# Patient Record
Sex: FEMALE | Race: OTHER | Employment: FULL TIME | URBAN - METROPOLITAN AREA
[De-identification: names, ages, dates, MRNs, and addresses within clinical notes are randomized per-mention and may not be internally consistent; named-entity substitution may affect disease eponyms.]

---

## 2022-10-14 ENCOUNTER — APPOINTMENT (OUTPATIENT)
Dept: GENERAL RADIOLOGY | Age: 27
DRG: 163 | End: 2022-10-14

## 2022-10-14 ENCOUNTER — HOSPITAL ENCOUNTER (INPATIENT)
Age: 27
LOS: 3 days | Discharge: HOME | DRG: 163 | End: 2022-10-17
Attending: EMERGENCY MEDICINE | Admitting: INTERNAL MEDICINE

## 2022-10-14 ENCOUNTER — APPOINTMENT (OUTPATIENT)
Dept: CT IMAGING | Age: 27
DRG: 163 | End: 2022-10-14

## 2022-10-14 DIAGNOSIS — I26.09 ACUTE PULMONARY EMBOLISM WITH ACUTE COR PULMONALE, UNSPECIFIED PULMONARY EMBOLISM TYPE (HCC): Primary | ICD-10-CM

## 2022-10-14 DIAGNOSIS — J96.01 ACUTE RESPIRATORY FAILURE WITH HYPOXIA (HCC): ICD-10-CM

## 2022-10-14 DIAGNOSIS — R77.8 ELEVATED TROPONIN: ICD-10-CM

## 2022-10-14 LAB
A/G RATIO: 1.2 (ref 1.1–2.2)
ALBUMIN SERPL-MCNC: 4.7 G/DL (ref 3.4–5)
ALP BLD-CCNC: 100 U/L (ref 40–129)
ALT SERPL-CCNC: 23 U/L (ref 10–40)
ANION GAP SERPL CALCULATED.3IONS-SCNC: 15 MMOL/L (ref 3–16)
APTT: 28.2 SEC (ref 23–34.3)
AST SERPL-CCNC: 27 U/L (ref 15–37)
BASOPHILS ABSOLUTE: 0.1 K/UL (ref 0–0.2)
BASOPHILS RELATIVE PERCENT: 0.4 %
BILIRUB SERPL-MCNC: 0.3 MG/DL (ref 0–1)
BUN BLDV-MCNC: 11 MG/DL (ref 7–20)
C-REACTIVE PROTEIN: 22.3 MG/L (ref 0–5.1)
CALCIUM SERPL-MCNC: 9.8 MG/DL (ref 8.3–10.6)
CHLORIDE BLD-SCNC: 102 MMOL/L (ref 99–110)
CO2: 23 MMOL/L (ref 21–32)
CREAT SERPL-MCNC: 0.9 MG/DL (ref 0.6–1.1)
D DIMER: >20 UG/ML FEU (ref 0–0.6)
EOSINOPHILS ABSOLUTE: 0 K/UL (ref 0–0.6)
EOSINOPHILS RELATIVE PERCENT: 0.2 %
GFR AFRICAN AMERICAN: >60
GFR NON-AFRICAN AMERICAN: >60
GLUCOSE BLD-MCNC: 114 MG/DL (ref 70–99)
HCG QUALITATIVE: NEGATIVE
HCT VFR BLD CALC: 43.9 % (ref 36–48)
HCT VFR BLD CALC: 48 % (ref 36–48)
HEMOGLOBIN: 14.4 G/DL (ref 12–16)
HEMOGLOBIN: 15.4 G/DL (ref 12–16)
LYMPHOCYTES ABSOLUTE: 1.4 K/UL (ref 1–5.1)
LYMPHOCYTES RELATIVE PERCENT: 10.6 %
MCH RBC QN AUTO: 31 PG (ref 26–34)
MCH RBC QN AUTO: 31.9 PG (ref 26–34)
MCHC RBC AUTO-ENTMCNC: 32.1 G/DL (ref 31–36)
MCHC RBC AUTO-ENTMCNC: 32.8 G/DL (ref 31–36)
MCV RBC AUTO: 96.4 FL (ref 80–100)
MCV RBC AUTO: 97.3 FL (ref 80–100)
MONOCYTES ABSOLUTE: 0.6 K/UL (ref 0–1.3)
MONOCYTES RELATIVE PERCENT: 4.3 %
NEUTROPHILS ABSOLUTE: 11.2 K/UL (ref 1.7–7.7)
NEUTROPHILS RELATIVE PERCENT: 84.5 %
PDW BLD-RTO: 13.5 % (ref 12.4–15.4)
PDW BLD-RTO: 13.8 % (ref 12.4–15.4)
PLATELET # BLD: 147 K/UL (ref 135–450)
PLATELET # BLD: 152 K/UL (ref 135–450)
PMV BLD AUTO: 8 FL (ref 5–10.5)
PMV BLD AUTO: 8.4 FL (ref 5–10.5)
POTASSIUM REFLEX MAGNESIUM: 3.8 MMOL/L (ref 3.5–5.1)
PRO-BNP: 113 PG/ML (ref 0–124)
RBC # BLD: 4.52 M/UL (ref 4–5.2)
RBC # BLD: 4.98 M/UL (ref 4–5.2)
SEDIMENTATION RATE, ERYTHROCYTE: 15 MM/HR (ref 0–20)
SODIUM BLD-SCNC: 140 MMOL/L (ref 136–145)
TOTAL PROTEIN: 8.5 G/DL (ref 6.4–8.2)
TROPONIN: 0.36 NG/ML
WBC # BLD: 13.3 K/UL (ref 4–11)
WBC # BLD: 13.3 K/UL (ref 4–11)

## 2022-10-14 PROCEDURE — 2000000000 HC ICU R&B

## 2022-10-14 PROCEDURE — 93005 ELECTROCARDIOGRAM TRACING: CPT | Performed by: PHYSICIAN ASSISTANT

## 2022-10-14 PROCEDURE — 84484 ASSAY OF TROPONIN QUANT: CPT

## 2022-10-14 PROCEDURE — 71260 CT THORAX DX C+: CPT | Performed by: PHYSICIAN ASSISTANT

## 2022-10-14 PROCEDURE — 36415 COLL VENOUS BLD VENIPUNCTURE: CPT

## 2022-10-14 PROCEDURE — 85652 RBC SED RATE AUTOMATED: CPT

## 2022-10-14 PROCEDURE — 6360000004 HC RX CONTRAST MEDICATION: Performed by: EMERGENCY MEDICINE

## 2022-10-14 PROCEDURE — 80053 COMPREHEN METABOLIC PANEL: CPT

## 2022-10-14 PROCEDURE — 83880 ASSAY OF NATRIURETIC PEPTIDE: CPT

## 2022-10-14 PROCEDURE — 84703 CHORIONIC GONADOTROPIN ASSAY: CPT

## 2022-10-14 PROCEDURE — 85379 FIBRIN DEGRADATION QUANT: CPT

## 2022-10-14 PROCEDURE — 6360000002 HC RX W HCPCS: Performed by: PHYSICIAN ASSISTANT

## 2022-10-14 PROCEDURE — 85730 THROMBOPLASTIN TIME PARTIAL: CPT

## 2022-10-14 PROCEDURE — 85027 COMPLETE CBC AUTOMATED: CPT

## 2022-10-14 PROCEDURE — 86140 C-REACTIVE PROTEIN: CPT

## 2022-10-14 PROCEDURE — 99285 EMERGENCY DEPT VISIT HI MDM: CPT

## 2022-10-14 PROCEDURE — 71045 X-RAY EXAM CHEST 1 VIEW: CPT

## 2022-10-14 PROCEDURE — 85025 COMPLETE CBC W/AUTO DIFF WBC: CPT

## 2022-10-14 PROCEDURE — 96374 THER/PROPH/DIAG INJ IV PUSH: CPT

## 2022-10-14 RX ORDER — HEPARIN SODIUM 1000 [USP'U]/ML
80 INJECTION, SOLUTION INTRAVENOUS; SUBCUTANEOUS PRN
Status: DISCONTINUED | OUTPATIENT
Start: 2022-10-14 | End: 2022-10-16 | Stop reason: ALTCHOICE

## 2022-10-14 RX ORDER — HEPARIN SODIUM 1000 [USP'U]/ML
80 INJECTION, SOLUTION INTRAVENOUS; SUBCUTANEOUS ONCE
Status: COMPLETED | OUTPATIENT
Start: 2022-10-14 | End: 2022-10-14

## 2022-10-14 RX ORDER — HEPARIN SODIUM 1000 [USP'U]/ML
40 INJECTION, SOLUTION INTRAVENOUS; SUBCUTANEOUS PRN
Status: DISCONTINUED | OUTPATIENT
Start: 2022-10-14 | End: 2022-10-16 | Stop reason: ALTCHOICE

## 2022-10-14 RX ORDER — HEPARIN SODIUM 10000 [USP'U]/100ML
5-30 INJECTION, SOLUTION INTRAVENOUS CONTINUOUS
Status: DISCONTINUED | OUTPATIENT
Start: 2022-10-14 | End: 2022-10-16

## 2022-10-14 RX ADMIN — HEPARIN SODIUM 18 UNITS/KG/HR: 10000 INJECTION, SOLUTION INTRAVENOUS at 20:40

## 2022-10-14 RX ADMIN — HEPARIN SODIUM 9430 UNITS: 1000 INJECTION INTRAVENOUS; SUBCUTANEOUS at 20:35

## 2022-10-14 RX ADMIN — IOPAMIDOL 75 ML: 755 INJECTION, SOLUTION INTRAVENOUS at 19:46

## 2022-10-14 ASSESSMENT — PAIN - FUNCTIONAL ASSESSMENT: PAIN_FUNCTIONAL_ASSESSMENT: NONE - DENIES PAIN

## 2022-10-14 ASSESSMENT — PAIN SCALES - GENERAL
PAINLEVEL_OUTOF10: 0
PAINLEVEL_OUTOF10: 0

## 2022-10-14 ASSESSMENT — PATIENT HEALTH QUESTIONNAIRE - PHQ9: SUM OF ALL RESPONSES TO PHQ QUESTIONS 1-9: 24

## 2022-10-14 NOTE — ED PROVIDER NOTES
[facility]        Pt Name: Caren Fernandez  MRN: [id number]  [place] 1995  Date of evaluation: 10/14/2022  Provider: Yasmin Davis PA-C  PCP: No primary care provider on file. Note Started: 4:17 PM EDT        I have seen and evaluated this patient with my supervising physician Akash Bose DO. I personally saw the patient and independently provided 33 minutes of non-concurrent critical care time out of the total critical care time provided. This excludes time spent doing separately billable procedures. This includes time at the bedside, data interpretation, medication management, obtaining critical history from collateral sources if the patient is unable to provide it directly, and physician consultation. Specifics of interventions taken and potentially life-threatening diagnostic considerations are listed above in the medical decision making. CHIEF COMPLAINT       Chief Complaint   Patient presents with    Panic Attack     From home via EMS took 3 Klonopin over last 2-3 days, also consumed [place] lot\" of alcohol over the last week, last alcohol was 3 white claw drinks today (last drink approx. 1430 today). Patient states under stress due to recent death of boyfriend. Having episodes of profuse sweating onset 1400 today. C/o chest tightness at triage. HISTORY OF PRESENT ILLNESS   (Location, Timing/Onset, Context/Setting, Quality, Duration, Modifying Factors, Severity, Associated Signs and Symptoms)  Note limiting factors. Chief Complaint: Shortness of breath, anxiety    Caren Fernandez is a 24 y.o. female who presents to the emergency department with a chief complaint of shortness of breath and anxiety. This began at around 2 PM today. He states he has been under a lot of stress and really not eating much after her boyfriend  about a week ago.   She states she felt fine if she was going over to her grandmother's house and around 2 PM a couple hours before I see her she began getting some palpitations and began feeling short of breath and having muffled hearing like she may have a syncopal episode. Denies hemoptysis, productive cough, chest pain, abdominal pain, vomiting, unilateral axillary tenderness, recent trip or travel, recent surgery or history of DVT or PE. Denies fevers, abdominal pain, urinary or bowel symptoms. States she has been taking Klonopin that she is got from a friend to help her sleep recently. She has been drinking some alcohol also due to all the stress she has been under. Denies black tarry stools, heavy vaginal bleeding or any abnormal bleeding. Nursing Notes were all reviewed and agreed with or any disagreements were addressed in the HPI. REVIEW OF SYSTEMS    (2-9 systems for level 4, 10 or more for level 5)     Review of Systems    Positives and Pertinent negatives as per HPI. Except as noted above in the ROS, all other systems were reviewed and negative. PAST MEDICAL HISTORY     Past Medical History:   Diagnosis Date    Cardiomyopathy [facility])     \"had that since I was born\"         SURGICAL HISTORY   History reviewed. No pertinent surgical history. CURRENTMEDICATIONS       Previous Medications    No medications on file         ALLERGIES     Patient has no known allergies. FAMILYHISTORY     History reviewed. No pertinent family history.        SOCIAL HISTORY       Social History     Tobacco Use    Smoking status: Never    Smokeless tobacco: Never   Vaping Use    Vaping Use: Never used   Substance Use Topics    Alcohol use: Yes    Drug use: Yes     Types: Marijuana (Weed)       SCREENINGS    Anya Coma Scale  Eye Opening: Spontaneous  Best Verbal Response: Oriented  Best Motor Response: Obeys commands  [place] Coma Scale Score: 15        PHYSICAL EXAM    (up to 7 for level 4, 8 or more for level 5)     ED Triage Vitals   BP Temp Temp src Pulse Resp SpO2 Height Weight   -- -- -- -- -- -- -- --       Physical Exam  Vitals and nursing note reviewed. Constitutional:       Appearance: She is well-developed. She is diaphoretic. HENT:      Head: Atraumatic. Nose: Nose normal.   Eyes:      General:         Right eye: No discharge. Left eye: No discharge. Cardiovascular:      Rate and Rhythm: Regular rhythm. Tachycardia present. Heart sounds: No murmur heard. No friction rub. No gallop. Pulmonary:      Effort: No respiratory distress. Breath sounds: No stridor. No wheezing, rhonchi or rales. Comments: Mild tachypnea  Abdominal:      General: Bowel sounds are normal. There is no distension. Palpations: Abdomen is soft. There is no mass. Tenderness: no abdominal tenderness There is no guarding or rebound. Hernia: No hernia is present. Musculoskeletal:         General: No swelling. Normal range of motion. Cervical back: Normal range of motion. Comments: Trace pretibial pitting edema bilaterally. No palpable cords, erythema or calf tenderness. Skin:     General: Skin is warm. Findings: No erythema or rash. Neurological:      Mental Status: She is alert and oriented to person, place, and time. Cranial Nerves: No cranial nerve deficit.    Psychiatric:         Behavior: Behavior normal.       DIAGNOSTIC RESULTS   LABS:    Labs Reviewed   CBC WITH AUTO DIFFERENTIAL - Abnormal; Notable for the following components:       Result Value    WBC 13.3 (*)     Neutrophils Absolute 11.2 (*)     All other components within normal limits   COMPREHENSIVE METABOLIC PANEL W/ REFLEX TO MG FOR LOW K - Abnormal; Notable for the following components:    Glucose 114 (*)     Total Protein 8.5 (*)     All other components within normal limits   D-DIMER, QUANTITATIVE - Abnormal; Notable for the following components:    D-Dimer, Quant >20.00 (*)     All other components within normal limits   TROPONIN - Abnormal; Notable for the following components:    Troponin 0.36 (*)     All other components within normal limits   C-REACTIVE PROTEIN - Abnormal; Notable for the following components:    CRP 22.3 (*)     All other components within normal limits   HCG, SERUM, QUALITATIVE   BRAIN NATRIURETIC PEPTIDE   SEDIMENTATION RATE       When ordered only abnormal lab results are displayed. All other labs were within normal range or not returned as of this dictation. EKG: When ordered, EKG's are interpreted by the Emergency Department Physician in the absence of a cardiologist.  Please see their note for interpretation of EKG. RADIOLOGY:   Non-plain film images such as CT, Ultrasound and MRI are read by the radiologist. Plain radiographic images are visualized and preliminarily interpreted by the ED Provider with the below findings:        Interpretation per the Radiologist below, if available at the time of this note:    XR CHEST PORTABLE   Final Result   No acute process. CT CHEST PULMONARY EMBOLISM W CONTRAST    (Results Pending)     No results found. PROCEDURES   Unless otherwise noted below, none     Procedures    CRITICAL CARE TIME       CONSULTS:  None      EMERGENCY DEPARTMENT COURSE and DIFFERENTIAL DIAGNOSIS/MDM:   Vitals:    Vitals:    10/14/22 1623 10/14/22 1915   BP: (!) 128/94 138/83   Pulse: (!) 119 (!) 119   Resp: 16 25   Temp: 97.8 °F (36.6 °C)    TempSrc: Oral    SpO2: 95% 96%   Weight: 260 lb (117.9 kg)    Height: 5' 7\" (1.702 m)        Patient was given the following medications:  Medications - No data to display      Is this patient to be included in the SEP-1 Core Measure due to severe sepsis or septic shock? No   Exclusion criteria - the patient is NOT to be included for SEP-1 Core Measure due to: Infection is not suspected    Patient presents with palpitations, near syncope. She presented tachycardic and satting in the 80s. She is on 2 L of nasal cannula oxygen and satting in the mid 90s now.   Slightly tachypneic with lung sounds are clear to auscultation. She is elevated D-dimer greater than 20 with a troponin of 0.36. Concern for pulmonary embolus. Still awaiting CT imaging at the end my shift. Has history of remote cardiomyopathy when she was younger but she is unsure of what kind. Continues denies any pain here. Patient will ultimately require admission. Please see attending note for further care. FINAL IMPRESSION    Near syncope  hypoxia    DISPOSITION/PLAN   DISPOSITION        PATIENT REFERRED TO:  No follow-up provider specified.     DISCHARGE MEDICATIONS:  New Prescriptions    No medications on file       DISCONTINUED MEDICATIONS:  Discontinued Medications    No medications on file              (Please note that portions of this note were completed with a voice recognition program.  Efforts were made to edit the dictations but occasionally words are mis-transcribed.)    Kaylyn Preston PA-C (electronically signed)            Kaylyn Preston PA-C  10/14/22 1942

## 2022-10-15 LAB
APTT: 127.4 SEC (ref 23–34.3)
APTT: 74.5 SEC (ref 23–34.3)
APTT: >180 SEC (ref 23–34.3)
BASOPHILS ABSOLUTE: 0.1 K/UL (ref 0–0.2)
BASOPHILS RELATIVE PERCENT: 0.9 %
EKG ATRIAL RATE: 128 BPM
EKG DIAGNOSIS: NORMAL
EKG P AXIS: 46 DEGREES
EKG P-R INTERVAL: 158 MS
EKG Q-T INTERVAL: 296 MS
EKG QRS DURATION: 92 MS
EKG QTC CALCULATION (BAZETT): 432 MS
EKG R AXIS: 66 DEGREES
EKG T AXIS: 37 DEGREES
EKG VENTRICULAR RATE: 128 BPM
EOSINOPHILS ABSOLUTE: 0.1 K/UL (ref 0–0.6)
EOSINOPHILS RELATIVE PERCENT: 0.6 %
HCT VFR BLD CALC: 37.8 % (ref 36–48)
HEMOGLOBIN: 12.5 G/DL (ref 12–16)
HOMOCYSTEINE: 8 UMOL/L (ref 0–10)
LV EF: 60 %
LVEF MODALITY: NORMAL
LYMPHOCYTES ABSOLUTE: 2.9 K/UL (ref 1–5.1)
LYMPHOCYTES RELATIVE PERCENT: 25.6 %
MCH RBC QN AUTO: 31.9 PG (ref 26–34)
MCHC RBC AUTO-ENTMCNC: 33 G/DL (ref 31–36)
MCV RBC AUTO: 96.5 FL (ref 80–100)
MONOCYTES ABSOLUTE: 0.6 K/UL (ref 0–1.3)
MONOCYTES RELATIVE PERCENT: 5.7 %
NEUTROPHILS ABSOLUTE: 7.5 K/UL (ref 1.7–7.7)
NEUTROPHILS RELATIVE PERCENT: 67.2 %
PDW BLD-RTO: 13.2 % (ref 12.4–15.4)
PLATELET # BLD: 140 K/UL (ref 135–450)
PMV BLD AUTO: 8.5 FL (ref 5–10.5)
RBC # BLD: 3.92 M/UL (ref 4–5.2)
WBC # BLD: 11.2 K/UL (ref 4–11)

## 2022-10-15 PROCEDURE — 99152 MOD SED SAME PHYS/QHP 5/>YRS: CPT

## 2022-10-15 PROCEDURE — B31S1ZZ FLUOROSCOPY OF RIGHT PULMONARY ARTERY USING LOW OSMOLAR CONTRAST: ICD-10-PCS | Performed by: SURGERY

## 2022-10-15 PROCEDURE — 83036 HEMOGLOBIN GLYCOSYLATED A1C: CPT

## 2022-10-15 PROCEDURE — 85613 RUSSELL VIPER VENOM DILUTED: CPT

## 2022-10-15 PROCEDURE — 85730 THROMBOPLASTIN TIME PARTIAL: CPT

## 2022-10-15 PROCEDURE — 85025 COMPLETE CBC W/AUTO DIFF WBC: CPT

## 2022-10-15 PROCEDURE — 80061 LIPID PANEL: CPT

## 2022-10-15 PROCEDURE — 2709999900 HC NON-CHARGEABLE SUPPLY

## 2022-10-15 PROCEDURE — 93970 EXTREMITY STUDY: CPT

## 2022-10-15 PROCEDURE — 99153 MOD SED SAME PHYS/QHP EA: CPT

## 2022-10-15 PROCEDURE — 84156 ASSAY OF PROTEIN URINE: CPT

## 2022-10-15 PROCEDURE — 02CQ3ZZ EXTIRPATION OF MATTER FROM RIGHT PULMONARY ARTERY, PERCUTANEOUS APPROACH: ICD-10-PCS | Performed by: SURGERY

## 2022-10-15 PROCEDURE — 36014 PLACE CATHETER IN ARTERY: CPT

## 2022-10-15 PROCEDURE — 2000000000 HC ICU R&B

## 2022-10-15 PROCEDURE — 85635 REPTILASE TEST: CPT

## 2022-10-15 PROCEDURE — 85610 PROTHROMBIN TIME: CPT

## 2022-10-15 PROCEDURE — 2500000003 HC RX 250 WO HCPCS

## 2022-10-15 PROCEDURE — 81240 F2 GENE: CPT

## 2022-10-15 PROCEDURE — 85670 THROMBIN TIME PLASMA: CPT

## 2022-10-15 PROCEDURE — 86147 CARDIOLIPIN ANTIBODY EA IG: CPT

## 2022-10-15 PROCEDURE — 85240 CLOT FACTOR VIII AHG 1 STAGE: CPT

## 2022-10-15 PROCEDURE — 02CR3ZZ EXTIRPATION OF MATTER FROM LEFT PULMONARY ARTERY, PERCUTANEOUS APPROACH: ICD-10-PCS | Performed by: SURGERY

## 2022-10-15 PROCEDURE — 82570 ASSAY OF URINE CREATININE: CPT

## 2022-10-15 PROCEDURE — C1887 CATHETER, GUIDING: HCPCS

## 2022-10-15 PROCEDURE — C1760 CLOSURE DEV, VASC: HCPCS

## 2022-10-15 PROCEDURE — 81241 F5 GENE: CPT

## 2022-10-15 PROCEDURE — 6360000002 HC RX W HCPCS: Performed by: INTERNAL MEDICINE

## 2022-10-15 PROCEDURE — 83090 ASSAY OF HOMOCYSTEINE: CPT

## 2022-10-15 PROCEDURE — 75743 ARTERY X-RAYS LUNGS: CPT

## 2022-10-15 PROCEDURE — 37184 PRIM ART M-THRMBC 1ST VSL: CPT

## 2022-10-15 PROCEDURE — C1753 CATH, INTRAVAS ULTRASOUND: HCPCS

## 2022-10-15 PROCEDURE — 6360000004 HC RX CONTRAST MEDICATION: Performed by: SURGERY

## 2022-10-15 PROCEDURE — C1894 INTRO/SHEATH, NON-LASER: HCPCS

## 2022-10-15 PROCEDURE — 36415 COLL VENOUS BLD VENIPUNCTURE: CPT

## 2022-10-15 PROCEDURE — 2580000003 HC RX 258

## 2022-10-15 PROCEDURE — 93306 TTE W/DOPPLER COMPLETE: CPT

## 2022-10-15 PROCEDURE — C1757 CATH, THROMBECTOMY/EMBOLECT: HCPCS

## 2022-10-15 PROCEDURE — B31T1ZZ FLUOROSCOPY OF LEFT PULMONARY ARTERY USING LOW OSMOLAR CONTRAST: ICD-10-PCS | Performed by: SURGERY

## 2022-10-15 PROCEDURE — C1769 GUIDE WIRE: HCPCS

## 2022-10-15 PROCEDURE — 6360000002 HC RX W HCPCS

## 2022-10-15 RX ORDER — ONDANSETRON 2 MG/ML
4 INJECTION INTRAMUSCULAR; INTRAVENOUS EVERY 6 HOURS PRN
Status: DISCONTINUED | OUTPATIENT
Start: 2022-10-15 | End: 2022-10-17 | Stop reason: HOSPADM

## 2022-10-15 RX ORDER — SODIUM CHLORIDE 0.9 % (FLUSH) 0.9 %
5-40 SYRINGE (ML) INJECTION EVERY 12 HOURS SCHEDULED
Status: DISCONTINUED | OUTPATIENT
Start: 2022-10-15 | End: 2022-10-17 | Stop reason: HOSPADM

## 2022-10-15 RX ORDER — ONDANSETRON 4 MG/1
4 TABLET, ORALLY DISINTEGRATING ORAL EVERY 8 HOURS PRN
Status: DISCONTINUED | OUTPATIENT
Start: 2022-10-15 | End: 2022-10-17 | Stop reason: HOSPADM

## 2022-10-15 RX ORDER — SODIUM CHLORIDE 9 MG/ML
INJECTION, SOLUTION INTRAVENOUS PRN
Status: DISCONTINUED | OUTPATIENT
Start: 2022-10-15 | End: 2022-10-17 | Stop reason: HOSPADM

## 2022-10-15 RX ORDER — ACETAMINOPHEN 325 MG/1
650 TABLET ORAL EVERY 6 HOURS PRN
Status: DISCONTINUED | OUTPATIENT
Start: 2022-10-15 | End: 2022-10-17 | Stop reason: HOSPADM

## 2022-10-15 RX ORDER — ACETAMINOPHEN 650 MG/1
650 SUPPOSITORY RECTAL EVERY 6 HOURS PRN
Status: DISCONTINUED | OUTPATIENT
Start: 2022-10-15 | End: 2022-10-17 | Stop reason: HOSPADM

## 2022-10-15 RX ORDER — POLYETHYLENE GLYCOL 3350 17 G/17G
17 POWDER, FOR SOLUTION ORAL DAILY PRN
Status: DISCONTINUED | OUTPATIENT
Start: 2022-10-15 | End: 2022-10-17 | Stop reason: HOSPADM

## 2022-10-15 RX ORDER — SODIUM CHLORIDE 0.9 % (FLUSH) 0.9 %
5-40 SYRINGE (ML) INJECTION PRN
Status: DISCONTINUED | OUTPATIENT
Start: 2022-10-15 | End: 2022-10-17 | Stop reason: HOSPADM

## 2022-10-15 RX ADMIN — ONDANSETRON 4 MG: 2 INJECTION INTRAMUSCULAR; INTRAVENOUS at 21:38

## 2022-10-15 RX ADMIN — HEPARIN SODIUM 15 UNITS/KG/HR: 10000 INJECTION, SOLUTION INTRAVENOUS at 10:39

## 2022-10-15 RX ADMIN — IOPAMIDOL 30 ML: 755 INJECTION, SOLUTION INTRAVENOUS at 12:23

## 2022-10-15 ASSESSMENT — PAIN DESCRIPTION - ORIENTATION: ORIENTATION: RIGHT

## 2022-10-15 ASSESSMENT — PAIN DESCRIPTION - LOCATION: LOCATION: GROIN

## 2022-10-15 ASSESSMENT — PAIN SCALES - GENERAL
PAINLEVEL_OUTOF10: 0
PAINLEVEL_OUTOF10: 5
PAINLEVEL_OUTOF10: 4

## 2022-10-15 NOTE — ED NOTES
Pt transported to [phone] in stable condition, all questions answered at time of handoff, VSS at time of handoff.      Marc Asif RN  10/14/22 6215

## 2022-10-15 NOTE — FLOWSHEET NOTE
Patient back from cath lab, vitals stable, patient denies pain, heparin drip infusing, per physician, drop rate per protocol at 1430, run overnight and will start on PO anticoagulant tomorrow. Right femoral venous site is clean dry and intact. Patient aware of restrictions until 1430, patients mother at bedside updated on plan of care.

## 2022-10-15 NOTE — PLAN OF CARE
Problem: Self Harm/Suicidality  Goal: Will have no self-injury during hospital stay  Description: INTERVENTIONS:  1. Q 30 MINUTES: Routine safety checks  2. Q SHIFT & PRN: Assess risk to determine if routine checks are adequate to maintain patient safety  Outcome: Progressing     Problem: Pain  Goal: Verbalizes/displays adequate comfort level or baseline comfort level  Outcome: Progressing  Flowsheets (Taken 10/14/2022 7425)  Verbalizes/displays adequate comfort level or baseline comfort level:   Encourage patient to monitor pain and request assistance   Assess pain using appropriate pain scale   Administer analgesics based on type and severity of pain and evaluate response   Implement non-pharmacological measures as appropriate and evaluate response   Consider cultural and social influences on pain and pain management - cont keppra - at baseline

## 2022-10-15 NOTE — PROGRESS NOTES
Vascular    CTPA reviewed. Large bilateral PE with RV strain.   Will tentatively plan on performing PA thrombectomy tomorrow am.

## 2022-10-15 NOTE — PROGRESS NOTES
Hospitalist Progress Note    Name:  Landon Obrien    /Age/Sex: 1995  (24 y.o. female)  MRN & CSN:  [id number] & [id number]    PCP: No primary care provider on file. Date of Admission: 10/14/2022    Patient Status:  Inpatient     Chief Complaint: dyspnea    Hospital Course:   82-year-old female with history of cardiomyopathy, obesity, marijuana use had swelling in her right leg for the last 3 to 4 days as well as some shortness of breath. She came to the ER thinking she was having a panic attack, but CT PE showed patient had bilateral pulmonary emboli. Started on heparin drip. Pulmonary artery thrombectomy by vascular surgery performed on 10/15/2022. Subjective: Today is:  Hospital Day: 2. Patient seen and examined in CVU-/2915. Patient states her breathing is much better after thrombectomy today. Denies any pain. Endorses appetite. Otherwise, no acute complaints. On room air and satting well. Denies tobacco use, though admits to marijuana use every other day. No long travel or trauma to her legs. Not on birth control. States her father does have coagulation disorder, but unsure what kind. She also states she sits for many hours during the day for her job which may have contributed to her blood clots.       Medications:  Reviewed    Infusion Medications    sodium chloride      heparin (PORCINE) Infusion 15 Units/kg/hr (10/15/22 7033)     Scheduled Medications    sodium chloride flush  5-40 mL IntraVENous 2 times per day     PRN Meds: sodium chloride flush, sodium chloride, ondansetron **OR** ondansetron, polyethylene glycol, acetaminophen **OR** acetaminophen, heparin (porcine), heparin (porcine), perflutren lipid microspheres      Intake/Output Summary (Last 24 hours) at 10/15/2022 0749  Last data filed at 10/15/2022 0730  Gross per 24 hour   Intake 68.18 ml   Output --   Net 68.18 ml       Physical Exam Performed:    /67   Pulse (!) 105   Temp (!) 96.7 °F (35.9 °C) (Temporal)   Resp 17   Ht 5' 7\" (1.702 m)   Wt 261 lb (118.4 kg)   LMP 09/23/2022 (Approximate)   SpO2 99%   BMI 40.88 kg/m²     General appearance: No apparent distress, appears stated age and cooperative. Obese. HEENT: Pupils equal, round, and reactive to light. Conjunctivae/corneas clear. Neck: Supple, with full range of motion. No jugular venous distention. Trachea midline. Respiratory:  Normal respiratory effort. Clear to auscultation, bilaterally without Rales/Wheezes/Rhonchi. Cardiovascular: Tachycardic rate and rhythm with normal S1/S2 without murmurs, rubs or gallops. No peripheral edema. Abdomen: Soft, non-tender, non-distended with normal bowel sounds. : No CVA tenderness. Musculoskeletal: No clubbing or cyanosis. Full range of motion without deformity. Skin: Skin color, texture, turgor normal.  No rashes or lesions. Neurologic:  Neurovascularly intact without any focal sensory/motor deficits. Cranial nerves: II-XII intact, grossly non-focal.  Psychiatric: Alert and oriented, thought content appropriate, normal insight  Peripheral Pulses: +2 palpable, equal bilaterally       Labs:   Recent Labs     10/14/22  1801 10/14/22  2000 10/15/22  0510   WBC 13.3* 13.3* 11.2*   HGB 14.4 15.4 12.5   HCT 43.9 48.0 37.8    147 140     Recent Labs     10/14/22  1801      K 3.8      CO2 23   BUN 11   CREATININE 0.9   CALCIUM 9.8     Recent Labs     10/14/22  1801   AST 27   ALT 23   BILITOT 0.3   ALKPHOS 100     No results for input(s): INR in the last 72 hours. Recent Labs     10/14/22  1801   TROPONINI 0.36*       Urinalysis:    No results found for: [employer], BACTERIA, RBCUA, BLOODU, SPECGRAV, GLUCOSEU    Radiology:  CT CHEST PULMONARY EMBOLISM W CONTRAST   Final Result   Large bilateral pulmonary emboli extending throughout both lungs with   evidence of right heart strain. Findings were given to Dr. Bibi Wilde in the ED on 10/14/2022 at 8:18 p.m.          XR CHEST PORTABLE   Final Result   No acute process. VL Extremity Venous Bilateral    (Results Pending)           Assessment/Plan:    Active Hospital Problems    Diagnosis     Pulmonary embolism with acute cor pulmonale, unspecified chronicity, unspecified pulmonary embolism type ([address] [I26.09]      Priority: Medium         Hospital Day: 2    This is a 24 y.o. female who presented to [facility] on 10/14/2022 and is being treated for:    PE  -s/p pulmonary artery thrombectomy on 10/15/2022 by vascular surgery  -CT PE showed large bilateral pulmonary emboli with evidence of right heart strain  -Echo ordered  -Lower extremity Dopplers indicate acute left partially occluding DVT in left popliteal vein; nonacute right lower extremity Dopplers  -Continue heparin drip for today; may convert to p.o. anticoagulation tomorrow  -Coagulation labs ordered; results pending  -Daily labs; Place electrolytes as needed  -Vascular surgery consulted; appreciate recommendations        DVT ppx: Heparin  GI ppx: Diet/Tube Feeds  Diet: ADULT DIET; Regular  Code Status: Full Code    Disposition:  Estimated Discharge Date: 2-4 days  Barriers to Discharge:  PE - anticoagulation      PT/OT Eval Status: ordered      Oleg Crabtree DO  10/15/2022  7:49 AM      Please note that some part of this chart was generated using Dragon dictation software. Although every effort was made to ensure the accuracy of this automated transcription, some errors in transcription may have occurred inadvertently. If you may need any clarification, please do not hesitate to contact me through [facility].

## 2022-10-15 NOTE — PLAN OF CARE
Problem: Self Harm/Suicidality  Goal: Will have no self-injury during hospital stay  Description: INTERVENTIONS:  1. Q 30 MINUTES: Routine safety checks  2. Q SHIFT & PRN: Assess risk to determine if routine checks are adequate to maintain patient safety  Outcome: Progressing   Patient denies thoughts of suicide at this time, will continue to assess patients thoughts of self harm. Problem: Pain  Goal: Verbalizes/displays adequate comfort level or baseline comfort level  Outcome: Progressing   Patient able to report pain, 0-10 scale used, pharmacologic and non pharmacologic  Interventions in use and discussed with patient.      Problem: ABCDS Injury Assessment  Goal: Absence of physical injury  Outcome: Progressing   Fall precautions in place, hourly rounding, call light and belongings in reach, bed in lowest position, wheels locked in place, side rails up x 2, walkways free of clutter

## 2022-10-15 NOTE — H&P
Hospital Medicine History & Physical      PCP: No primary care provider on file. Date of Admission: 10/14/2022    Date of Service: Pt seen/examined on 10/14/2022 and Admitted to Inpatient with expected LOS greater than two midnights due to medical therapy. Chief Complaint:  shortness of breath, heart racing      History Of Present Illness:    24 y.o. female Farhana Godoy  - with reported hx of cardiomyopathy, hx of obesity Body mass index is 40.88 kg/m². , marijuana use, hx of COVID19 +ve 01/2022 and 06/2022  - had leg swelling x 3-4 days, this morning her right leg felt heavy and had some discomfort and she shook her legs to help with the symptoms. Thereafter noted racing of her heart and palpitations worse with exertion. At 2 PM when she was at her grandmother's house she noted that she was getting short of breath on minimal exertion, worse palpitations and felt as if she is going to pass out. Initially she thought she is having a panic attack so ignored the symptoms but with persistent symptoms presented to ED for further evaluation.  -  In the emergency room noted to be tachycardic, desats on minimal exertion, placed on 2 L oxygen. D-dimer greater than 20.0. CT pulmonary embolism protocol was done which showed bilateral pulmonary embolism with right heart strain. Troponin elevated. She denies smoking cigarettes. Uses marijuana daily. Denies alcohol abuse or any illicit drug use. Denies birth control use. No family history of DVT or pulm embolism. She does not have a primary care and is not up-to-date on her Pap smears. Works from home, but walks 3-4 times per week where she goes 2 to 3 miles. Past Medical History:          Diagnosis Date    Cardiomyopathy [facility])     \"had that since I was born\"       Past Surgical History:      History reviewed. No pertinent surgical history.     Medications Prior to Admission:      Prior to Admission medications    Not on File       Allergies:  Patient has no known allergies. Social History:      The patient currently lives at home  Works from home, previously used to worse as     TOBACCO:   reports that she has never smoked. She has never used smokeless tobacco.  ETOH:   reports current alcohol use. Family History:      No known hx of cancers or DVT/PE/blood disorders    History reviewed. No pertinent family history. REVIEW OF SYSTEMS:   Pertinent positives as noted in the HPI. All other systems reviewed and negative. PHYSICAL EXAM PERFORMED:    BP (!) 107/92   Pulse (!) 138   Temp 97.8 °F (36.6 °C) (Oral)   Resp 27   Ht 5' 7\" (1.702 m)   Wt 260 lb (117.9 kg)   LMP 09/23/2022 (Approximate)   SpO2 93%   BMI 40.72 kg/m²     General appearance:  No apparent distress, appears stated age and cooperative. HEENT:  Normal cephalic, atraumatic without obvious deformity. Pupils equal, round, and reactive to light. Extra ocular muscles intact. Conjunctivae/corneas clear. Neck: Supple, with full range of motion. No jugular venous distention. Trachea midline. Respiratory:  Normal respiratory effort at rest. Clear to auscultation, bilaterally without Rales/Wheezes/Rhonchi. Cardiovascular:  Regular rate and rhythm with normal S1/S2 without murmurs, rubs or gallops. Abdomen: Soft, non-tender, non-distended with normal bowel sounds. Musculoskeletal:  No clubbing, cyanosis or edema bilaterally. Full range of motion without deformity. Skin: Skin color, texture, turgor normal.  No rashes or lesions. Neurologic:  Neurovascularly intact without any focal sensory/motor deficits.  Cranial nerves: II-XII intact, grossly non-focal.  Psychiatric:  Alert and oriented, thought content appropriate, normal insight  Capillary Refill: Brisk,< 3 seconds   Peripheral Pulses: +2 palpable, equal bilaterally       Labs:     Recent Labs     10/14/22  1801 10/14/22  2000   WBC 13.3* 13.3*   HGB 14.4 15.4   HCT 43.9 48.0    147     Recent Labs 10/14/22  1801      K 3.8      CO2 23   BUN 11   CREATININE 0.9   CALCIUM 9.8     Recent Labs     10/14/22  1801   AST 27   ALT 23   BILITOT 0.3   ALKPHOS 100     No results for input(s): INR in the last 72 hours. Recent Labs     10/14/22  1801   TROPONINI 0.36*       Urinalysis:    No results found for: Jj Meo, [address], BACTERIA, RBCUA, BLOODU, [address], GLUCOSEU    Radiology:     CXR: I have reviewed the CXR with the following interpretation: no consolidation, effusion, pneumothorax  EKG:  I have reviewed the EKG with the following interpretation: Sinus tachycardia, v rate 128, normal axis, normal VA and QRS intervals, normal Qtc, no ST elevations or depressions, TWI V2-V4, impression sinus tachycardia with nonspecific T wave morphology and incomplete RBBB, no STEMI    CT CHEST PULMONARY EMBOLISM W CONTRAST   Final Result   Large bilateral pulmonary emboli extending throughout both lungs with   evidence of right heart strain. Findings were given to Dr. Carlos Ortiz in the ED on 10/14/2022 at 8:18 p.m. XR CHEST PORTABLE   Final Result   No acute process. ASSESSMENT/PLAN:    Active Hospital Problems    Diagnosis Date Noted    Pulmonary embolism with acute cor pulmonale, unspecified chronicity, unspecified pulmonary embolism type ([facility] [address] [I26.09] 10/14/2022     Priority: Medium     Acute bilateral pulmonary embolism with acute cor pulmonale, risk factor includes obesity. She smokes marijuana but not cigarettes. Needs hypercoagulable work-up done, and need to be updated on age-appropriate cancer screening. Counseled on getting Pap smear done. She has been started on heparin drip, she is on 2 L oxygen, monitor closely on telemetry, vascular surgery planning on intervention procedure at 11 AM on Saturday.   - [address] [place], [place] [address]vd [facility], but was told it is not done @ [facility]  - Will send APC resistance(Factor V leiden), PT gene mutation, Homocysteine level, Factor VIII levels, Anticardiolipin, Lupus Ac ab,  Urine protein? for Nephrotic syndrome,   - Note: 1. Protein C,S and AT III levels are affected by acute thrombosis and anticoagulation. Therefore levels best assessed > 2 weeks after completing anticoagulation course. Age appropriate malignancy screening need to be done for unprovoked DVT (cancer is diagnosed in 7-10 % with idiopathic DVT)  - Needs Hematology evaluation as outpatient    Right leg discomfort and heaviness, this morning had to shake her leg up to help with symptoms. We will check venous duplex bilateral lower extremity to see the extent of DVTs. Morbid obesity Body mass index is 40.88 kg/m². - Complicating assessment and treatment. Placing patient at risk for multiple co-morbidities as well as early death and contributing to the patient's presentation.  on weight loss when appropriate. DVT Prophylaxis: Heparin gtt  Diet: Diet NPO  Code Status: No Order    PT/OT Eval Status: n/a    Dispo - Admit as inpatient. I anticipate hospitalization spanning more than two midnights for investigation and treatment of the above medically necessary diagnoses. Pt has a high probability of imminent or life-threatening deterioration requiring close monitoring, and highly complex decision-making and/or interventions of high intensity to assess, manipulate, and support his critical organ systems to prevent the his inevitable decline which would occur if left untreated. A total critical care time 38 minutes spent, this includes but not limited to examining patient, collaborating with other physicians, monitoring vital signs, telemetry, and clinical response to IV medications; documentation time, review and interpretation of laboratory and radiological data, review of nursing notes and old record review. This time excludes any time that may have been spent performing procedures.      Roxy Elder MD  Hospitalist  Attending Physician

## 2022-10-15 NOTE — CONSULTS
Vascular Surgery Consultation    Date of Admission:  10/14/2022  3:37 PM  Date of Consultation:  10/15/2022    PCP:  No primary care provider on file. Chief Complaint: Shortness of breath, palpitations    History of Present Illness: We are asked to see this patient in consultation by Dr. Daphne Mario regarding bilateral PE. Angela Chavis is a 24 y.o. female who presents with above c/o. Abrupt onset of symptoms yesterday. Eval in ED showed elevated Troponin with CTA shwoing bilateral large PE and RV dilatation. No prior history of DVT/PE. No recent travel or immobility. She currently reports SOB but no CP. Palpitations decreased with decreased HR. She is currently on Heparin drip. Past Medical History:  Past Medical History:   Diagnosis Date    Cardiomyopathy ([address]     \"had that since I was born\"       Past Surgical History:  History reviewed. No pertinent surgical history. Home Medications:   Prior to Admission medications    Not on File        Facility Administered Medications:    sodium chloride flush  5-40 mL IntraVENous 2 times per day       Allergies:  Patient has no known allergies.      Social History:      Social History     Socioeconomic History    Marital status: Single     Spouse name: Not on file    Number of children: Not on file    Years of education: Not on file    Highest education level: Not on file   Occupational History    Not on file   Tobacco Use    Smoking status: Never    Smokeless tobacco: Never   Vaping Use    Vaping Use: Never used   Substance and Sexual Activity    Alcohol use: Yes    Drug use: Yes     Types: Marijuana Burnell Goldberg)    Sexual activity: Not on file   Other Topics Concern    Not on file   Social History Narrative    Not on file     Social Determinants of Health     Financial Resource Strain: Not on file   Food Insecurity: Not on file   Transportation Needs: Not on file   Physical Activity: Not on file   Stress: Not on file   Social Connections: Not on file   Intimate Partner Violence: Not on file   Housing Stability: Not on file       Family History:    History reviewed. No pertinent family history. Review of Systems:  A 14 point review of systems was completed. Pertinent positives identified in the HPI, all other review of systems negative. Physical Examination:    /67   Pulse (!) 105   Temp (!) 96.7 °F (35.9 °C) (Temporal)   Resp 17   Ht 5' 7\" (1.702 m)   Wt 261 lb (118.4 kg)   LMP 09/23/2022 (Approximate)   SpO2 99%   BMI 40.88 kg/m²        Admission Weight: 260 lb (117.9 kg)       General appearance: NAD  Eyes: PERRLA  Neck: no JVD, no lymphadenopathy. Respiratory: effort is unlabored, no crackles, wheezes or rubs. Cardiovascular: regular, no murmur. No carotid bruits. Pulses:    DP   RIGHT 2   LEFT 2   GI: abdomen soft, nondistended, no organomegaly. Musculoskeletal: strength and tone normal.  Extremities: warm and pink. Skin: no dermatitis or ulceration. Neuro/psychiatric: grossly intact. ASA 2 - Patient with mild systemic disease with no functional limitations    Mallampati Airway Assessment:  Mallampati Class II - (soft palate, fauces & uvula are visible)     MEDICAL DECISION MAKING/TESTING        CTA:  images personally reviewed and interpreted. Large bilateral PE R and L main PA.  RV dilated. Labs:   CBC:   Recent Labs     10/14/22  1801 10/14/22  2000 10/15/22  0510   WBC 13.3* 13.3* 11.2*   HGB 14.4 15.4 12.5   HCT 43.9 48.0 37.8   MCV 97.3 96.4 96.5    147 140     BMP:   Recent Labs     10/14/22  1801      K 3.8      CO2 23   BUN 11   CREATININE 0.9   CALCIUM 9.8     Cardiac Enzymes:   Recent Labs     10/14/22  1801   TROPONINI 0.36*     PT/INR: No results for input(s): PROTIME, INR in the last 72 hours.   APTT:   Recent Labs     10/14/22  2000 10/15/22  0200   APTT 28.2 >180.0*     Liver Profile:  Lab Results   Component Value Date/Time    AST 27 10/14/2022 06:01 PM    ALT 23 10/14/2022 06:01 PM    BILITOT 0.3 10/14/2022 06:01 PM    ALKPHOS 100 10/14/2022 06:01 PM   No results found for: CHOL, HDL, TRIG  TSH:  No results found for: TSH  UA: No results found for: NITRITE, COLORU, PHUR, LABCAST, WBCUA, RBCUA, MUCUS, TRICHOMONAS, YEAST, BACTERIA, CLARITYU, SPECGRAV, LEUKOCYTESUR, UROBILINOGEN, BILIRUBINUR, BLOODU, GLUCOSEU, AMORPHOUS      Assessment:  Acute bilateral PE with cor pulmonale. Plan: Pulmonary artery thrombectomy. Procedure, risks, benefits, and alternatives explained to patient and mother. They understand and consent to proceed.

## 2022-10-15 NOTE — ED PROVIDER NOTES
In addition to the advanced practice provider, I personally saw Anita Flaherty and performed a substantive portion of the visit including all aspects of the medical decision making. Briefly, this is a 24 y.o. female here for shortness of breath ongoing off and on for the past 2 weeks. Patient states she has been under tremendous amount of stress lately and felt she was having panic attacks, states she was able to breathe through these, however around 1400 she had sudden onset of much more severe symptoms which did not resolve. Denies any history of similar. Associated with palpitations and lightheadedness. On exam, patient afebrile and nontoxic. No distress. Heart tachycardic, regular rhythm. Lungs CTAB. Abdomen soft, nondistended, nontender to palpation in all quadrants. EKG  EKG was reviewed by emergency department physician in the absence of a cardiologist    Narrow complex sinus rhythm, rate 128, normal axis, normal GA and QRS intervals, normal Qtc, no ST elevations or depressions, TWI V2-V4, impression sinus tachycardia with nonspecific T wave morphology and incomplete RBBB, no STEMI, no comparison available      Screenings   Brooks Coma Scale  Eye Opening: Spontaneous  Best Verbal Response: Oriented  Best Motor Response: Obeys commands  Brooks Coma Scale Score: 15      Is this patient to be included in the SEP-1 Core Measure due to severe sepsis or septic shock? No   Exclusion criteria - the patient is NOT to be included for SEP-1 Core Measure due to: Infection is not suspected      MDM    Patient afebrile and nontoxic. At time of my evaluation she is in no distress. Initially hypoxic, this was corrected with 2 L supplemental oxygen by nasal cannula. EKG with sinus tachycardia, no STEMI, troponin is elevated 0.36, patient denies any chest pain to me, CTA shows significant clot burden from pulmonary emboli and I suspect this is etiology of elevated troponin. Lower suspicion for ACS.   No malignant dysrhythmia. No findings to suggest infection, patient is not septic. Remainder of laboratory work-up is reassuring without other evidence of acute endorgan dysfunction or clinically significant electrolyte derangement. CTA findings were discussed with radiologist, there is evidence of right ventricular strain. I discussed risk versus benefits of heparinization with patient and her mother and they were agreeable to proceed. I did also discuss case with Dr. Delaney Bose for vascular surgery who agrees with current management plan, will evaluate for potential catheter directed thrombolysis in the morning, no indication for emergent surgical intervention at this time. Case discussed with internal medicine team who will admit for further evaluation and care. On my reevaluation at time of admission, patient remained with corrected hypoxia, was alert and hemodynamically stable. I Dr. Zuleima Amado am the primary clinician of record. Critical Care:    I have discussed the case with the advanced practice provider. I have personally performed a history, physical exam, and my own medical decision making. I have reviewed the note and agree with the findings and plan. Upon my evaluation, this patient had a high probability of imminent or life-threatening deterioration due to acute hypoxic respiratory failure, bilateral pulmonary emboli which required my direct attention, intervention, and personal management. Specialist consultation with vascular surgery was obtained. I personally saw the patient and independently provided 35 minutes of non-concurrent critical care out of the total shared critical care time provided. The critical care time spent while evaluating and treating this patient was exclusive of any time spent doing separately billable procedures.   This critical care time includes time at the bedside, data interpretation, medication management, monitoring for potential decompensation and physician consultation. Specifics of interventions taken and potentially life-threatening diagnostic considerations are listed above in the medical decision making. Patient Referrals:  No follow-up provider specified. Discharge Medications:  New Prescriptions    No medications on file       FINAL IMPRESSION  1. Acute pulmonary embolism with acute cor pulmonale, unspecified pulmonary embolism type (HCC)    2. Elevated troponin    3. Acute respiratory failure with hypoxia (HCC)        Blood pressure (!) 123/90, pulse (!) 128, temperature 97.8 °F (36.6 °C), temperature source Oral, resp. rate 20, height 5' 7\" (1.702 m), weight 260 lb (117.9 kg), last menstrual period 09/23/2022, SpO2 92 %. For further details of [facility] emergency department encounter, please see documentation by advanced practice provider, TAL Good.     Catherine Palafox DO (electronically signed)  Attending Emergency Physician       Catherine Palafox DO  10/14/22 3750

## 2022-10-16 PROBLEM — J96.01 ACUTE RESPIRATORY FAILURE WITH HYPOXIA (HCC): Status: ACTIVE | Noted: 2022-10-16

## 2022-10-16 PROBLEM — I51.89 RIGHT VENTRICULAR SYSTOLIC DYSFUNCTION WITHOUT HEART FAILURE: Status: ACTIVE | Noted: 2022-10-16

## 2022-10-16 PROBLEM — R79.89 ELEVATED TROPONIN: Status: ACTIVE | Noted: 2022-10-16

## 2022-10-16 PROBLEM — R77.8 ELEVATED TROPONIN: Status: ACTIVE | Noted: 2022-10-16

## 2022-10-16 LAB
ALBUMIN SERPL-MCNC: 3.4 G/DL (ref 3.4–5)
ANION GAP SERPL CALCULATED.3IONS-SCNC: 7 MMOL/L (ref 3–16)
APTT: 69.7 SEC (ref 23–34.3)
APTT: 77.7 SEC (ref 23–34.3)
BASOPHILS ABSOLUTE: 0 K/UL (ref 0–0.2)
BASOPHILS RELATIVE PERCENT: 0.5 %
BUN BLDV-MCNC: 11 MG/DL (ref 7–20)
CALCIUM SERPL-MCNC: 8.7 MG/DL (ref 8.3–10.6)
CHLORIDE BLD-SCNC: 101 MMOL/L (ref 99–110)
CHOLESTEROL, TOTAL: 141 MG/DL (ref 0–199)
CO2: 27 MMOL/L (ref 21–32)
CREAT SERPL-MCNC: 0.7 MG/DL (ref 0.6–1.1)
CREATININE URINE: 324.6 MG/DL (ref 28–259)
EOSINOPHILS ABSOLUTE: 0.2 K/UL (ref 0–0.6)
EOSINOPHILS RELATIVE PERCENT: 2 %
ESTIMATED AVERAGE GLUCOSE: 96.8 MG/DL
GFR AFRICAN AMERICAN: >60
GFR NON-AFRICAN AMERICAN: >60
GLUCOSE BLD-MCNC: 99 MG/DL (ref 70–99)
HBA1C MFR BLD: 5 %
HCT VFR BLD CALC: 36.1 % (ref 36–48)
HDLC SERPL-MCNC: 64 MG/DL (ref 40–60)
HEMOGLOBIN: 11.9 G/DL (ref 12–16)
LDL CHOLESTEROL CALCULATED: 52 MG/DL
LYMPHOCYTES ABSOLUTE: 3.1 K/UL (ref 1–5.1)
LYMPHOCYTES RELATIVE PERCENT: 33 %
MCH RBC QN AUTO: 32.3 PG (ref 26–34)
MCHC RBC AUTO-ENTMCNC: 32.9 G/DL (ref 31–36)
MCV RBC AUTO: 98 FL (ref 80–100)
MONOCYTES ABSOLUTE: 0.7 K/UL (ref 0–1.3)
MONOCYTES RELATIVE PERCENT: 7 %
NEUTROPHILS ABSOLUTE: 5.4 K/UL (ref 1.7–7.7)
NEUTROPHILS RELATIVE PERCENT: 57.5 %
PDW BLD-RTO: 13.7 % (ref 12.4–15.4)
PHOSPHORUS: 3.9 MG/DL (ref 2.5–4.9)
PLATELET # BLD: 132 K/UL (ref 135–450)
PMV BLD AUTO: 8.7 FL (ref 5–10.5)
POTASSIUM SERPL-SCNC: 3.5 MMOL/L (ref 3.5–5.1)
PROTEIN PROTEIN: 31 MG/DL
PROTEIN/CREAT RATIO: 0.1 MG/DL
RBC # BLD: 3.69 M/UL (ref 4–5.2)
SODIUM BLD-SCNC: 135 MMOL/L (ref 136–145)
TRIGL SERPL-MCNC: 124 MG/DL (ref 0–150)
VLDLC SERPL CALC-MCNC: 25 MG/DL
WBC # BLD: 9.4 K/UL (ref 4–11)

## 2022-10-16 PROCEDURE — 6360000002 HC RX W HCPCS: Performed by: INTERNAL MEDICINE

## 2022-10-16 PROCEDURE — 80069 RENAL FUNCTION PANEL: CPT

## 2022-10-16 PROCEDURE — 94760 N-INVAS EAR/PLS OXIMETRY 1: CPT

## 2022-10-16 PROCEDURE — 1200000000 HC SEMI PRIVATE

## 2022-10-16 PROCEDURE — 6370000000 HC RX 637 (ALT 250 FOR IP): Performed by: STUDENT IN AN ORGANIZED HEALTH CARE EDUCATION/TRAINING PROGRAM

## 2022-10-16 PROCEDURE — 85730 THROMBOPLASTIN TIME PARTIAL: CPT

## 2022-10-16 PROCEDURE — 97161 PT EVAL LOW COMPLEX 20 MIN: CPT

## 2022-10-16 PROCEDURE — 97165 OT EVAL LOW COMPLEX 30 MIN: CPT

## 2022-10-16 PROCEDURE — 6370000000 HC RX 637 (ALT 250 FOR IP): Performed by: INTERNAL MEDICINE

## 2022-10-16 PROCEDURE — 85025 COMPLETE CBC W/AUTO DIFF WBC: CPT

## 2022-10-16 PROCEDURE — 2580000003 HC RX 258: Performed by: INTERNAL MEDICINE

## 2022-10-16 RX ADMIN — APIXABAN 10 MG: 5 TABLET, FILM COATED ORAL at 20:35

## 2022-10-16 RX ADMIN — APIXABAN 10 MG: 5 TABLET, FILM COATED ORAL at 10:26

## 2022-10-16 RX ADMIN — HEPARIN SODIUM 12 UNITS/KG/HR: 10000 INJECTION, SOLUTION INTRAVENOUS at 03:32

## 2022-10-16 RX ADMIN — ACETAMINOPHEN 650 MG: 325 TABLET ORAL at 08:14

## 2022-10-16 RX ADMIN — SODIUM CHLORIDE, PRESERVATIVE FREE 20 ML: 5 INJECTION INTRAVENOUS at 20:36

## 2022-10-16 ASSESSMENT — PAIN SCALES - GENERAL
PAINLEVEL_OUTOF10: 0
PAINLEVEL_OUTOF10: 0
PAINLEVEL_OUTOF10: 2
PAINLEVEL_OUTOF10: 0

## 2022-10-16 ASSESSMENT — PAIN DESCRIPTION - LOCATION: LOCATION: GROIN

## 2022-10-16 ASSESSMENT — PAIN DESCRIPTION - ORIENTATION: ORIENTATION: RIGHT;LEFT

## 2022-10-16 NOTE — PROGRESS NOTES
Vascular    S/P PA Thrombectomy  Reports no CP or SOB- on RA  R groin access site without bleeding or hematoma    Ok to ambulate  Recc change to oral anticoagulation, stop Heparin and discharge. Outpatient f/u with Hematology recommended.

## 2022-10-16 NOTE — PROGRESS NOTES
Hospitalist Progress Note    Name:  Adebayo Joshi DOB/Age/Sex: 1995  (24 y.o. female)  MRN & CSN:  [id number] & [id number]    PCP: No primary care provider on file. Date of Admission: 10/14/2022    Patient Status:  Inpatient     Chief Complaint: dyspnea    Hospital Course:   22-year-old female with history of cardiomyopathy, obesity, marijuana use had swelling in her right leg for the last 3 to 4 days as well as some shortness of breath. She came to the ER thinking she was having a panic attack, but CT PE showed patient had bilateral pulmonary emboli. Started on heparin drip. Pulmonary artery thrombectomy by vascular surgery performed on 10/15/2022. Subjective: Today is:  Hospital Day: 3. Patient seen and examined in CVU-/2915. Sitting up in bed. Denies any shortness of breath. Eating and drinking well. No acute complaints. After further discussion, it was determined that her paternal grandfather had a clotting disorder, not her father. Medications:  Reviewed    Infusion Medications    sodium chloride      heparin (PORCINE) Infusion 12 Units/kg/hr (10/16/22 0332)     Scheduled Medications    sodium chloride flush  5-40 mL IntraVENous 2 times per day     PRN Meds: sodium chloride flush, sodium chloride, ondansetron **OR** ondansetron, polyethylene glycol, acetaminophen **OR** acetaminophen, heparin (porcine), heparin (porcine), perflutren lipid microspheres      Intake/Output Summary (Last 24 hours) at 10/16/2022 0738  Last data filed at 10/16/2022 0555  Gross per 24 hour   Intake 556.43 ml   Output 600 ml   Net -43.57 ml       Physical Exam Performed:    /71   Pulse 71   Temp 97 °F (36.1 °C) (Temporal)   Resp 18   Ht 5' 7\" (1.702 m)   Wt 265 lb 14.4 oz (120.6 kg)   LMP 2022 (Approximate)   SpO2 95%   BMI 41.65 kg/m²     General appearance: No apparent distress, appears stated age and cooperative. Obese.    HEENT: Pupils equal, round, and reactive to light. Conjunctivae/corneas clear. Neck: Supple, with full range of motion. No jugular venous distention. Trachea midline. Respiratory:  Normal respiratory effort. Clear to auscultation, bilaterally without Rales/Wheezes/Rhonchi. Cardiovascular: Tachycardic rate and rhythm with normal S1/S2 without murmurs, rubs or gallops. No peripheral edema. Abdomen: Soft, non-tender, non-distended with normal bowel sounds. : No CVA tenderness. Musculoskeletal: No clubbing or cyanosis. Full range of motion without deformity. Skin: Skin color, texture, turgor normal.  No rashes or lesions. Neurologic:  Neurovascularly intact without any focal sensory/motor deficits. Cranial nerves: II-XII intact, grossly non-focal.  Psychiatric: Alert and oriented, thought content appropriate, normal insight  Peripheral Pulses: +2 palpable, equal bilaterally       Labs:   Recent Labs     10/14/22  2000 10/15/22  0510 10/16/22  0515   WBC 13.3* 11.2* 9.4   HGB 15.4 12.5 11.9*   HCT 48.0 37.8 36.1    140 132*     Recent Labs     10/14/22  1801 10/16/22  0515    135*   K 3.8 3.5    101   CO2 23 27   BUN 11 11   CREATININE 0.9 0.7   CALCIUM 9.8 8.7   PHOS  --  3.9     Recent Labs     10/14/22  1801   AST 27   ALT 23   BILITOT 0.3   ALKPHOS 100     No results for input(s): INR in the last 72 hours. Recent Labs     10/14/22  1801   TROPONINI 0.36*       Urinalysis:    No results found for: Teola Herzog, BACTERIA, RBCUA, BLOODU, SPECGRAV, GLUCOSEU    Radiology:  VL Extremity Venous Bilateral         CT CHEST PULMONARY EMBOLISM W CONTRAST   Final Result   Large bilateral pulmonary emboli extending throughout both lungs with   evidence of right heart strain. Findings were given to Dr. Alondra Jacobs in the ED on 10/14/2022 at 8:18 p.m. XR CHEST PORTABLE   Final Result   No acute process.                  Assessment/Plan:    Active Hospital Problems    Diagnosis     Acute pulmonary embolism with acute cor pulmonale ([address] [I26.09]      Priority: Medium         Hospital Day: 3    This is a 24 y.o. female who presented to [facility] on 10/14/2022 and is being treated for:    PE  -s/p pulmonary artery thrombectomy on 10/15/2022 by vascular surgery  -CT PE showed large bilateral pulmonary emboli with evidence of right heart strain  -Echo 10/15 showed LVEF 60% with D-shape septum due to R ventricular pressure and volume overload with mild concentric LV hypertrophy; severe hypokinesis of RV free wall with hyperdynamic apex  -Lower extremity Dopplers indicate acute left partially occluding DVT in left popliteal vein; nonacute right lower extremity Dopplers  -Convert to p.o. anticoagulation today; stop heparin gtt  -Denies tobacco use, though admits to marijuana use every other day  -No long travel or trauma to her legs  -Not on birth control  -States her paternal grandfather does have coagulation disorder, but unsure what kind   -She also states she sits for many hours during the day for her job which may have contributed to her blood clots  -Daily labs; replace electrolytes as needed  -Vascular surgery consulted; appreciate recommendations  -Cardiology consulted for R heart strain on echo and may need to follow up outpatient with them  -Hematology consulted so pt will have outpt follow up      Monitor for next 24 hours while starting p.o. anticoagulation today and with cardiology consulted. Transfer out of ICU. Plan for discharge tomorrow. DVT ppx: Heparin  GI ppx: Diet/Tube Feeds  Diet: ADULT DIET; Regular  Code Status: Full Code    Disposition:  Estimated Discharge Date: tomorrow  Barriers to Discharge:  convert to po anticoagulation      PT/OT Eval Status: salinas Stewart DO  10/16/2022  7:38 AM      Please note that some part of this chart was generated using Dragon dictation software.  Although every effort was made to ensure the accuracy of this automated transcription, some errors in transcription may have occurred inadvertently. If you may need any clarification, please do not hesitate to contact me through [facility].

## 2022-10-16 NOTE — CONSULTS
[address]  Advanced CHF/Pulmonary Hypertension   Cardiac Evaluation      Sami Bowers  YOB: 1995    Requesting PHysician:  Dr. Mcgee Gave      Chief Complaint   Patient presents with    Panic Attack     From home via EMS took 3 Klonopin over last 2-3 days, also consumed \"a lot\" of alcohol over the last week, last alcohol was 3 white claw drinks today (last drink approx. 1430 today). Patient states under stress due to recent death of boyfriend. Having episodes of profuse sweating onset 1400 today. C/o chest tightness at triage. History of Present Illness:  Sami Bowers is a 25 yo female who presented to the ED with shortness of breath and anxiety. She has been under a lot of stress and not eating very much as her boyfriend was killed about a week ago. She started noticing palpitations, feeling short of breath, and was presyncopal.  Denies hemoptysis, productive cough, chest pain, abdominal pain, vomiting, recent trip or travel, recent surgery or history of DVT or PE. Denies fevers, abdominal pain, urinary or bowel symptoms. Denies tarry stools, heavy vaginal bleeding. She has history of obesity. She uses marijuana occasionally. She had covid 1/2022 and 6/2022. She has noticed leg swelling for 3-4 days. In retrospect, she thinks these symptoms have been building for several weeks. In the ED, she was tachycardic, desating on minimal exertion, placed on 2 liters oxygen. D dimer was very high, CTPA showed bilateral PE with right heart strain. Troponin was also elevated. She had thrombolytic treatment yesterday by vascular surgery and feels better today. Denies birth control use or smoking. No family history of DVT or pulmonary embolism. She works from home, is somewhat sedentary, but not extremely. We are asked to see her for follow up of the PE and right heart strains.     Labs:  Sodium 135  K 3.5  BUN/Cre 11/0.7  Albumin 3.4  Hgb 11.9  CRP 22.3  Troponin 0.36  D dimer > 20    EKG:  sinus tach, incomplete RBBB, anterolateral T wave changes    Echo:   Summary   Normal LV systolic function with an estimated ejection fraction of 60%. D-shape septum due to right ventricular pressure and volume overload. Indeterminate diastolic function. Mild concentric LV hypertrophy. The right ventricle appears severely dilated and hypokinetic. There is severe hypokinesis of the RV free wall with the apex appearing   hyperdynamic (Medrano's sign) c/w pulmonary embolus. Mild tricuspid regurgitation with an estimated PASP of 23 mmHg. Mild pulmonic regurgitation present. Small anterior pericardial effusion noted. CT chest:  Impression   Large bilateral pulmonary emboli extending throughout both lungs with   evidence of right heart strain.      Meds prior to admission:  None    No Known Allergies  Current Facility-Administered Medications   Medication Dose Route Frequency Provider Last Rate Last Admin    apixaban (ELIQUIS) tablet 10 mg  10 mg Oral BID Alondra Riley DO   10 mg at 10/16/22 1026    Followed by    Gama Bautista ON 10/23/2022] apixaban (ELIQUIS) tablet 5 mg  5 mg Oral BID Anthony Lynn DO        sodium chloride flush 0.9 % injection 5-40 mL  5-40 mL IntraVENous 2 times per day Heather Rodriguez MD        sodium chloride flush 0.9 % injection 5-40 mL  5-40 mL IntraVENous PRN Heather Rodriguez MD        0.9 % sodium chloride infusion   IntraVENous PRN Heather Rodriguez MD        ondansetron (ZOFRAN-ODT) disintegrating tablet 4 mg  4 mg Oral Q8H PRN Heather Rodriguez MD        Or    ondansetron [facility]) injection 4 mg  4 mg IntraVENous Q6H PRN Heather Rodriguez MD   4 mg at 10/15/22 2138    polyethylene glycol (GLYCOLAX) packet 17 g  17 g Oral Daily PRN Heather Rodriguez MD        acetaminophen (TYLENOL) tablet 650 mg  650 mg Oral Q6H PRN Heather Rodriguez MD   650 mg at 10/16/22 4264    Or    acetaminophen (TYLENOL) suppository 650 mg  650 mg Rectal Q6H PRN Delroy Jean MD Lencho        perflutren lipid microspheres (DEFINITY) injection 1.65 mg  1.5 mL IntraVENous ONCE PRN Jorgito Patel MD           Past Medical History:   Diagnosis Date    Cardiomyopathy [facility])     \"had that since I was born\"     History reviewed. No pertinent surgical history. History reviewed. No pertinent family history. Social History     Socioeconomic History    Marital status: Single     Spouse name: Not on file    Number of children: Not on file    Years of education: Not on file    Highest education level: Not on file   Occupational History    Not on file   Tobacco Use    Smoking status: Never    Smokeless tobacco: Never   Vaping Use    Vaping Use: Never used   Substance and Sexual Activity    Alcohol use: Yes    Drug use: Yes     Types: Marijuana [facility])    Sexual activity: Not on file   Other Topics Concern    Not on file   Social History Narrative    Not on file     Social Determinants of Health     Financial Resource Strain: Not on file   Food Insecurity: Not on file   Transportation Needs: Not on file   Physical Activity: Not on file   Stress: Not on file   Social Connections: Not on file   Intimate Partner Violence: Not on file   Housing Stability: Not on file       Review of Systems:   Constitutional: there has been no unanticipated weight loss. There's been no change in energy level, sleep pattern, or activity level. Eyes: No visual changes or diplopia. No scleral icterus. ENT: No Headaches, hearing loss or vertigo. No mouth sores or sore throat. Cardiovascular: Reviewed in HPI  Respiratory: No cough or wheezing, no sputum production. No hematemesis. Gastrointestinal: No abdominal pain, appetite loss, blood in stools. No change in bowel or bladder habits. Genitourinary: No dysuria, trouble voiding, or hematuria. Musculoskeletal:  No gait disturbance, weakness or joint complaints. Integumentary: No rash or pruritis.   Neurological: No headache, diplopia, change in muscle strength, numbness or tingling. No change in gait, balance, coordination, mood, affect, memory, mentation, behavior. Psychiatric: No anxiety, no depression. Endocrine: No malaise, fatigue or temperature intolerance. No excessive thirst, fluid intake, or urination. No tremor. Hematologic/Lymphatic: No abnormal bruising or bleeding, blood clots or swollen lymph nodes. Allergic/Immunologic: No nasal congestion or hives. Physical Examination:    Vitals:    10/16/22 0037 10/16/22 0445 10/16/22 0555 10/16/22 0758   BP: 120/72 120/71  123/72   Pulse: 80 71  76   Resp: 18 18  18   Temp: 97.2 °F (36.2 °C) 97 °F (36.1 °C)  97.3 °F (36.3 °C)   TempSrc: Temporal Temporal  Temporal   SpO2: 96% 95%  97%   Weight:   265 lb 14.4 oz (120.6 kg)    Height:         Body mass index is 41.65 kg/m². Wt Readings from Last 3 Encounters:   10/16/22 265 lb 14.4 oz (120.6 kg)     BP Readings from Last 3 Encounters:   10/16/22 123/72     Constitutional and General Appearance:   WD/WN in NAD  HEENT:  NC/AT  MOMO  No problems with hearing  Skin:  Warm, dry  Respiratory:  Normal excursion and expansion without use of accessory muscles  Resp Auscultation: Normal breath sounds without dullness  Cardiovascular: The apical impulses not displaced  Heart tones are crisp and normal  Cervical veins are not engorged  The carotid upstroke is normal in amplitude and contour without delay or bruit  JVP less than 8 cm H2O  RRR with nl S1 and S2 without m,r,g  Peripheral pulses are symmetrical and full  There is no clubbing, cyanosis of the extremities.   No edema  Femoral Arteries: 2+ and equal  Pedal Pulses: 2+ and equal   Neck:  No thyromegaly  Abdomen:  No masses or tenderness  Liver/Spleen: No Abnormalities Noted  Neurological/Psychiatric:  Alert and oriented in all spheres  Moves all extremities well  Exhibits normal gait balance and coordination  No abnormalities of mood, affect, memory, mentation, or behavior are noted    Labs were reviewed including labs from other hospital systems through [facility]. Cardiac testing was reviewed including echos, nuclear scans, cardiac catheterization, including from other hospital systems through [facility]. Assessment:    1. Acute pulmonary embolism with acute cor pulmonale, unspecified pulmonary embolism type (HCC)    2. Elevated troponin    3. Acute respiratory failure with hypoxia (HCC)     4.  obesity    Plan:   Continue Eliquis  Needs work up for inherited reasons to have DVT/PE  She will need a follow up echo in several months to evaluate right heart function  Will arrange follow up in our office. The patient was seen for > 70 minutes. I reviewed interval history, physical exam, review of data including labs, imaging, development and implementation of treatment plan and coordination of complex care. More than 50% of the time was devoted to counseling the patient on their diagnoses/treatments, as well as coordination of care with the other care teams    I appreciate the opportunity of cooperating in the care of this patient.     Delmis Montenegro M.D., [facility] [place]

## 2022-10-16 NOTE — PLAN OF CARE
Problem: Self Harm/Suicidality  Goal: Will have no self-injury during hospital stay  Description: INTERVENTIONS:  1. Q 30 MINUTES: Routine safety checks  2. Q SHIFT & PRN: Assess risk to determine if routine checks are adequate to maintain patient safety  10/15/2022 2238 by Michale Collet, RN  Outcome: Progressing  10/15/2022 1531 by Genet Davidson RN  Outcome: Progressing     Problem: Pain  Goal: Verbalizes/displays adequate comfort level or baseline comfort level  10/15/2022 2238 by Michale Collet, RN  Outcome: Progressing  10/15/2022 1531 by Genet Davidson RN  Outcome: Progressing     Problem: ABCDS Injury Assessment  Goal: Absence of physical injury  10/15/2022 2238 by Michale Collet, RN  Outcome: Progressing  10/15/2022 1531 by Genet Davidson RN  Outcome: Progressing

## 2022-10-16 NOTE — PROGRESS NOTES
[address] [facility] Ms[address] Department   Phone: [phone]    Occupational Therapy    [x] Initial Evaluation            [] Daily Treatment Note         [x] Discharge Summary      Patient: Ricardo Leiva   : 1995   MRN: [id number]   Date of Service:  10/16/2022    Admitting Diagnosis:  Acute pulmonary embolism with acute cor pulmonale [facility])  Current Admission Summary:    Past Medical History:  has a past medical history of Cardiomyopathy ([facility] [address] Past Surgical History:  has no past surgical history on file. Discharge Recommendations: Ricardo Leiva scored a 18/24 on the AM-PAC ADL Inpatient form. Current research shows that an AM-PAC score of 17 or less is typically not associated with a discharge to the patient's home setting. Based on the patient's AM-PAC score and their current ADL deficits, it is recommended that the patient have 3-5 sessions per week of Occupational Therapy at d/c to increase the patient's independence. Please see assessment section for further patient specific details. If patient discharges prior to next session this note will serve as a discharge summary. Please see below for the latest assessment towards goals.       DME Required For Discharge: no DME required at discharge    Precautions/Restrictions: no restrictions  Weight Bearing Restrictions: weight bearing as tolerated  [] Right Upper Extremity  [] Left Upper Extremity [] Right Lower Extremity  [] Left Lower Extremity     Required Braces/Orthotics: no braces required   [] Right  [] Left  Positional Restrictions:no positional restrictions      Pre-Admission Information     Lives With: alone    Type of Home: apartment  Home Layout: one level  Home Access: level entry,   step to enter without rails  Patient plans to go to mom's house at discharge and everything is on one level        Examination     Vision:   Vision Gross Assessment: WFL  Hearing:   [facility]  Perception: WFL  Observation:   General Observation:  up and independent in room  Posture:   wnl  Sensation:   WFL  Proprioception:    WFL  Tone:   Normotonic  Coordination Testing:   WFL    ROM:   (B) UE AROM WFL  Strength:   (B) UE strength grossly WFL    Decision Making: low complexity  Clinical Presentation: stable      Subjective    General: pateint alert and oriented and states she feels so much better           Activities of Daily Living    Basic Activities of Daily Living patient independent per report    Instrumental Activities of Daily Living      Functional Mobility    Bed Mobility  Independent per report  Comments:  Transfers  independent  Comments:  Functional Mobility:  Independent throughout    Other Therapeutic Interventions      Functional Outcomes         Cognition  WFL  Orientation:    alert and oriented x 4  Command Following:   [facility]     Education    Barriers To Learning: none  Patient Education: patient educated on OT role and benefits  Learning Assessment:  patient verbalizes and demonstrates understanding    Assessment    Activity Tolerance: good  Impairments Requiring Therapeutic Intervention: none - eval with same day discharge  Prognosis: excellent  Clinical Assessment: Patient demonstrates independence and is ready for discharge  Safety Interventions: call light within reach       Plan    Frequency: Eval with same day discharge. No follow up required. Current Treatment Recommendations: no further treatment needs    Goals    Patient Goals: go home with family     Short Term Goals:  Time Frame discharge         Therapy Session Time     Individual Group Co-treatment   Time In     1440   Time Out     1450   Minutes             Timed Code Treatment Minutes:        Total Treatment Minutes:  10    Electronically Signed By: Germaine Velasco OT

## 2022-10-16 NOTE — OP NOTE
[address]                     [address] [facility], [address] [facility]                                OPERATIVE REPORT    PATIENT NAME: Anatoliy Pineda                      :        1995  MED REC NO:   [id number]                          ROOM:       4194  ACCOUNT NO:   [de-identified]                           ADMIT DATE: 10/14/2022  PROVIDER:     Lindsay Way MD    DATE OF PROCEDURE:  10/15/2022    PREOPERATIVE DIAGNOSIS:  Bilateral large pulmonary emboli with acute cor  pulmonale. POSTOPERATIVE DIAGNOSIS:  Bilateral large pulmonary emboli with acute  cor pulmonale. OPERATION PERFORMED:  1. Ultrasound-guided right femoral venous access. 2.  Placement of catheters in subsegmental right and left pulmonary  arteries. 3.  Selective right and left pulmonary angiograms. 4.  Mechanical thrombectomy of the right pulmonary artery. 5.  Mechanical thrombectomy of the left pulmonary artery. SURGEON:  Lindsay Way MD    ANESTHESIA:   Local with moderate monitored sedation. INDICATIONS:  The patient is a 82-year-old female who presented with  severe shortness of breath, chest pain, hypoxia, and tachycardia. CT  angiogram was performed showing large pulmonary emboli with a very  dilated right ventricle. Her troponin was elevated to 2.36. The  patient was brought to the angio suite/hybrid OR to undergo angiography  and pulmonary thrombectomy. PROCEDURE IN DETAIL:  The patient was brought to the angio suite and  placed in the supine position. Under my direct supervision, versed and  fentanyl were administered for moderate sedation. The patient was  monitored by an independent trained nurse observer using continuous  blood pressure, EKG, and pulse oximetry. I spent 83 minutes  face-to-face with the patient providing indirect Tabitha sedation. The  right femoral region was prepped and draped in a sterile fashion.    Ultrasound was used to identify the common femoral vein, which was  patent and then easily compressible and free of thrombus. Under direct  visualization, this was accessed. Then a 6-Panamanian introducer sheath was  placed. digital copy of the ultrasound image was saved and placed in  the patient's record. The Bentson wire was advanced into the inferior  vena cava. The 6-Panamanian sheath was removed and over the wire a 6-Panamanian  Perclose device was placed. The sutures were tagged for closure of the  venotomy at the conclusion of the procedure. The 6-Panamanian sheath was  reintroduced. The Bentson wire was exchanged for an Amplatz wire over a  catheter. The skin and subcutaneous tract down to the vein over the  wire were dilated sequentially using 10, 16, and 20-Panamanian dilators. I  then placed a 24-Panamanian Inari sheath over the Amplatz wire. A pigtail  catheter was then advanced over the wire. The wire was removed and  replaced with a Glidewire Advantage wire. This was used to traverse the  right atrium and right ventricle and placed in the pulmonary outflow  tract. The catheter was then removed and replaced with a long JR-4  catheter, which was used to advance the wire into the distal right  subsegmental pulmonary arteries. The wire was removed and replaced with  an Amplatz wire with a 1-cm tip. The Inari 24-Panamanian device was then  advanced over the wire into the distal right main pulmonary artery and  pulmonary thrombectomy was performed. Once a significant amount of clot  was removed and no further clot could be removed, I placed pigtail  catheter through the sheath and right pulmonary angiogram was performed  that showed excellent flow with no residual thrombus. The catheter was  then pulled back into the main pulmonary artery and using the pigtail  catheter, I cannulated the left pulmonary artery and advanced the wire  into the distal subsegmental branches and advanced the catheter over the  wire.   The Amplatz wire was then advanced through the catheter and the  dilator was replaced in the Inari device and advanced into the left main  pulmonary artery. Pulmonary thrombectomy was then performed. I then  used a 16-Citizen of [place] curved Inari device through the 24-Citizen of [place] device to  place this in the distal lower lobe branches that have the most  significant amount of thrombus. Thrombectomy was performed removing  significant amount of thrombus. Pulmonary angiogram was performed  introducing a catheter through the sheath into the pulmonary artery. This showed some residual thrombus in lower lobe branches. The position  of the wire and catheter were adjusted and additional thrombectomy was  performed. Repeat angiogram was then performed showing clearance of  thrombus. The patient's heart rate, which initially was in the 120s,  was now approximately 99 and her saturation had increased to 100%. The  catheters and wire were then removed from the sheath. The sheath was  then removed holding pressure in the groin while the preplaced Perclose  sutures were secured. Pressure was held in the groin for approximately  10 minutes achieving excellent hemostasis. Estimated blood loss  throughout the procedure was approximately 100 to 200 mL. The patient  tolerated this quite well and was transferred to the intensive care unit  in a stable condition following this procedure.         Porsche Diaz MD    D: 10/15/2022 14:54:27       T: 10/15/2022 14:57:21     [id number]  Job#: [id number]     Doc#: [id number]    CC:

## 2022-10-16 NOTE — PROGRESS NOTES
[address]   Phone: [phone]    Physical Therapy    [x] Initial Evaluation            [] Daily Treatment Note         [x] Discharge Summary      Patient: Abner Marc   : 1995   MRN: [id number]   Date of Service:  10/16/2022  Admitting Diagnosis: Acute pulmonary embolism with acute cor pulmonale ([facility] [address]  Current Admission Summary: Abner Marc is a 24 y.o. female who presents to the emergency department with a chief complaint of shortness of breath and anxiety. This began at around 2 PM today. He states he has been under a lot of stress and really not eating much after her boyfriend  about a week ago. She states she felt fine if she was going over to her grandmother's house and around 2 PM a couple hours before I see her she began getting some palpitations and began feeling short of breath and having muffled hearing like she may have a syncopal episode. Denies hemoptysis, productive cough, chest pain, abdominal pain, vomiting, unilateral axillary tenderness, recent trip or travel, recent surgery or history of DVT or PE. Denies fevers, abdominal pain, urinary or bowel symptoms. States she has been taking Klonopin that she is got from a friend to help her sleep recently. She has been drinking some alcohol also due to all the stress she has been under. Denies black tarry stools, heavy vaginal bleeding or any abnormal bleeding. B large PEs s/p Pulmonary artery thrombectomy by vascular surgery performed on 10/15/2022. Past Medical History:  has a past medical history of Cardiomyopathy ([facility] [address] Past Surgical History:  has no past surgical history on file. Discharge Recommendations: Abner Marc scored a 24/24 on the AM-PAC short mobility form. At this time, no further PT is recommended upon discharge due to patient at independent level. Recommend patient returns to prior setting with prior services.     DME Required For Discharge: No new DME required  Precautions/Restrictions: no restrictions  Positional Restrictions:no positional restrictions    Pre-Admission Information   Lives With: alone    Plans to stay with mother who lives in a ranch style home. Tub shower with curtain. No equipment. Independent at baseline. Examination     ROM:   (B) LE ROM WFL  Strength:   (B) LE gross strength WFL  Decision Making: low complexity  Clinical Presentation: stable      Subjective  General: lying in bed, agreed to evaluation   Pain: 2/10. Location: right groin   Pain Interventions: not applicable       Functional Mobility  Bed Mobility  Supine to Sit: Independent  Comments:  Transfers  Sit to stand transfer: Independent  Stand to sit transfer: Independent  Comments:  Ambulation  Surface:level surface  Assistive Device: no device  Assistance: Independent  Distance: 350 ft   Gait Mechanics: no gait deviations   Comments:    Stair Mobility  Stair mobility not completed on this date. Comments:  Balance  Static Standing Balance: good(+): independent with high level dynamic balance in unsupported position  Dynamic Standing Balance: good(+): independent with high level dynamic balance in unsupported position  Comments:    Other Therapeutic Interventions    Functional Outcomes  AM-PAC Inpatient Mobility Raw Score : 24              Cognition  WFL  Orientation:    A&O x 4    Education  Barriers To Learning: none  Patient Education: patient educated on PT role and benefits  Learning Assessment:  Pt verbalized and demonstrates understanding    Assessment  Impairments Requiring Therapeutic Intervention: none - eval with same day discharge  Prognosis: good without need for therapy intervention  Clinical Assessment: Patient presenting at independent level for completion of required mobility tasks for return to home. Eval with d/c at this time. No therapy services indicated. Safety Interventions: patient up ad ana     Plan  Frequency: Eval with same day discharge.   No follow up required. Current Treatment Recommendations: Not applicable, evaluation completed with same day discharge. Goals  Patient eval with same day discharge. No goals set as patient is at baseline mobility status.       Therapy Session Time      Individual Group Co-treatment   Time In     4470   Time Out     1359   Minutes     8     Timed Code Treatment Minutes:  0 Minutes  Total Treatment Minutes:  8       Electronically Signed By: Pietro Pollard, [id number]

## 2022-10-17 VITALS
BODY MASS INDEX: 40.45 KG/M2 | TEMPERATURE: 97 F | WEIGHT: 257.7 LBS | HEART RATE: 82 BPM | DIASTOLIC BLOOD PRESSURE: 58 MMHG | SYSTOLIC BLOOD PRESSURE: 115 MMHG | HEIGHT: 67 IN | OXYGEN SATURATION: 96 % | RESPIRATION RATE: 15 BRPM

## 2022-10-17 PROBLEM — J96.01 ACUTE RESPIRATORY FAILURE WITH HYPOXIA (HCC): Status: RESOLVED | Noted: 2022-10-16 | Resolved: 2022-10-17

## 2022-10-17 PROBLEM — R77.8 ELEVATED TROPONIN: Status: RESOLVED | Noted: 2022-10-16 | Resolved: 2022-10-17

## 2022-10-17 PROBLEM — R79.89 ELEVATED TROPONIN: Status: RESOLVED | Noted: 2022-10-16 | Resolved: 2022-10-17

## 2022-10-17 LAB
ALBUMIN SERPL-MCNC: 3.8 G/DL (ref 3.4–5)
ANION GAP SERPL CALCULATED.3IONS-SCNC: 10 MMOL/L (ref 3–16)
ANTICARDIOLIPIN IGG ANTIBODY: <10 GPL
BASOPHILS ABSOLUTE: 0.1 K/UL (ref 0–0.2)
BASOPHILS RELATIVE PERCENT: 0.8 %
BUN BLDV-MCNC: 9 MG/DL (ref 7–20)
CALCIUM SERPL-MCNC: 8.9 MG/DL (ref 8.3–10.6)
CARDIOLIPIN AB IGM: 11 MPL
CHLORIDE BLD-SCNC: 103 MMOL/L (ref 99–110)
CO2: 25 MMOL/L (ref 21–32)
CREAT SERPL-MCNC: 0.8 MG/DL (ref 0.6–1.1)
EOSINOPHILS ABSOLUTE: 0.2 K/UL (ref 0–0.6)
EOSINOPHILS RELATIVE PERCENT: 2.5 %
FACTOR VIII ACTIVITY: 215 % (ref 50–150)
GFR AFRICAN AMERICAN: >60
GFR NON-AFRICAN AMERICAN: >60
GLUCOSE BLD-MCNC: 100 MG/DL (ref 70–99)
HCT VFR BLD CALC: 35.5 % (ref 36–48)
HEMOGLOBIN: 11.8 G/DL (ref 12–16)
LYMPHOCYTES ABSOLUTE: 2.6 K/UL (ref 1–5.1)
LYMPHOCYTES RELATIVE PERCENT: 28.3 %
MCH RBC QN AUTO: 32.1 PG (ref 26–34)
MCHC RBC AUTO-ENTMCNC: 33.2 G/DL (ref 31–36)
MCV RBC AUTO: 96.7 FL (ref 80–100)
MONOCYTES ABSOLUTE: 0.6 K/UL (ref 0–1.3)
MONOCYTES RELATIVE PERCENT: 6.1 %
NEUTROPHILS ABSOLUTE: 5.7 K/UL (ref 1.7–7.7)
NEUTROPHILS RELATIVE PERCENT: 62.3 %
PDW BLD-RTO: 13 % (ref 12.4–15.4)
PHOSPHORUS: 5.1 MG/DL (ref 2.5–4.9)
PLATELET # BLD: 160 K/UL (ref 135–450)
PMV BLD AUTO: 8.4 FL (ref 5–10.5)
POTASSIUM SERPL-SCNC: 3.6 MMOL/L (ref 3.5–5.1)
RBC # BLD: 3.67 M/UL (ref 4–5.2)
SODIUM BLD-SCNC: 138 MMOL/L (ref 136–145)
WBC # BLD: 9.2 K/UL (ref 4–11)

## 2022-10-17 PROCEDURE — 85025 COMPLETE CBC W/AUTO DIFF WBC: CPT

## 2022-10-17 PROCEDURE — 2580000003 HC RX 258: Performed by: INTERNAL MEDICINE

## 2022-10-17 PROCEDURE — 80069 RENAL FUNCTION PANEL: CPT

## 2022-10-17 PROCEDURE — 6370000000 HC RX 637 (ALT 250 FOR IP): Performed by: STUDENT IN AN ORGANIZED HEALTH CARE EDUCATION/TRAINING PROGRAM

## 2022-10-17 RX ADMIN — SODIUM CHLORIDE, PRESERVATIVE FREE 10 ML: 5 INJECTION INTRAVENOUS at 08:54

## 2022-10-17 RX ADMIN — APIXABAN 10 MG: 5 TABLET, FILM COATED ORAL at 08:54

## 2022-10-17 ASSESSMENT — PAIN SCALES - GENERAL
PAINLEVEL_OUTOF10: 0
PAINLEVEL_OUTOF10: 0

## 2022-10-17 NOTE — CARE COORDINATION
A new [facility] out clinic appt was made for patient and placed on discharge instructions. RN and pt aware.     Patient discharged 10/17/2022  to home   All discharge needs met per case management     Caren Nice RN, BSN  [phone]

## 2022-10-17 NOTE — PLAN OF CARE
Problem: Self Harm/Suicidality  Goal: Will have no self-injury during hospital stay  Description: INTERVENTIONS:  1. Q 30 MINUTES: Routine safety checks  2. Q SHIFT & PRN: Assess risk to determine if routine checks are adequate to maintain patient safety  10/17/2022 1310 by Jasen Nieto RN  Outcome: Adequate for Discharge  10/17/2022 0140 by Michale Collet, RN  Outcome: Progressing     Problem: Pain  Goal: Verbalizes/displays adequate comfort level or baseline comfort level  10/17/2022 1310 by Jasen Nieto RN  Outcome: Adequate for Discharge  Flowsheets (Taken 10/17/2022 0848)  Verbalizes/displays adequate comfort level or baseline comfort level:   Encourage patient to monitor pain and request assistance   Assess pain using appropriate pain scale   Administer analgesics based on type and severity of pain and evaluate response   Implement non-pharmacological measures as appropriate and evaluate response  10/17/2022 0140 by Michale Collet, RN  Outcome: Progressing     Problem: ABCDS Injury Assessment  Goal: Absence of physical injury  10/17/2022 1310 by Jasen Nieto RN  Outcome: Adequate for Discharge  10/17/2022 0140 by Michale Collet, RN  Outcome: Progressing

## 2022-10-17 NOTE — PROGRESS NOTES
CLINICAL PHARMACY NOTE: MEDS TO BEDS    Total # of Prescriptions Filled: 1   The following medications were delivered to the patient:  Eliquis starter pack    Additional Documentation:  Ata Reyez RN picked up from Outpatient Pharmacy=Signed  Deric Ly CPhT

## 2022-10-17 NOTE — CARE COORDINATION
Cm met with pt and she is uninsured. Pt received meds to bed and informed her working on outpt follow up [facility] clinic appt. She verbalized understanding.     Magen Soriano RN, BSN  [phone]

## 2022-10-17 NOTE — PLAN OF CARE
Problem: Self Harm/Suicidality  Goal: Will have no self-injury during hospital stay  Description: INTERVENTIONS:  1. Q 30 MINUTES: Routine safety checks  2. Q SHIFT & PRN: Assess risk to determine if routine checks are adequate to maintain patient safety  Outcome: Progressing     Problem: Pain  Goal: Verbalizes/displays adequate comfort level or baseline comfort level  Outcome: Progressing     Problem: ABCDS Injury Assessment  Goal: Absence of physical injury  Outcome: Progressing

## 2022-10-17 NOTE — DISCHARGE SUMMARY
Hospital Medicine Discharge Summary    Name:  Landon Obrien  Gender: female  : 1995  27 y.o. MRN: [id number]    PCP: No primary care provider on file. Date of Admission:  10/14/2022  3:37 PM  Discharge Date: 10/17/2022    Admitting Physician: Sissy Martinez MD  Discharge Physician: Drew Sanchez DO    Communication to PCP  -Started on eliquis 10mg BID until 10/22/22 and will continue 5mg BID for the next 3 months for PE  -Hypercoaguable work up started, not completed  -Vascular surgery performed bilateral thrombectomy on 10/15/2022  -R heart strain noted on echo - will follow up with cardiology  -Will follow up with hematology    Discharge Diagnoses: Active Hospital Problems    Diagnosis     Acute respiratory failure with hypoxia (HCC) [J96.01]      Priority: Medium    Elevated troponin [R77.8]      Priority: Medium    Right ventricular systolic dysfunction without heart failure [I51.89]      Priority: Medium    Acute pulmonary embolism with acute cor pulmonale (HCC) [I26.09]      Priority: Medium       The patient was seen and examined on day of discharge and this discharge summary is in conjunction with any daily progress note from day of discharge. Hospital Course:  Landon Obrien is a 24y.o. year old female who presented to [facility] on 10/14/2022  3:37 PM.      Patient came in with swelling for the last 3 to 4 days of her right leg prior to admission. Additionally, she noticed racing of her heart and palpitations it was worse with exertion. She was at her grandmother's house when she felt like she was going to pass out and thought she was having a panic attack for the palpations and the dyspnea on exertion. In the ER she was placed on oxygen and CT PE found patient had bilateral pulmonary embolism with evidence of right heart strain. Patient was seen by vascular surgery and was taken back for bilateral thrombectomy the next day.   Echo on 10/15/22 showed LVEF 60%, but also showed dilated right ventricle with hypokinesis as well as severe hypokinesis of the right ventricular free wall with apex appearing hyperdynamic. Patient was started on a heparin drip and successfully converted over to p.o. Eliquis. Hypercoagulable work-up was started, though not completed. Patient was seen by cardiology for outpatient follow-up. Patient was seen by hematology for outpatient follow-up. On the last day of hospital stay, patient expressed understanding of importance of Eliquis. She denied any chest pain. She states she is little tachycardic when she gets up and walks, but no shortness of breath. No palpitations. Here to lifestyle modifications. The patient expressed appropriate understanding of and agreement with the discharge recommendations, medications, and plan. Physical Exam Performed:     /61   Pulse 80   Temp 97.3 °F (36.3 °C) (Temporal)   Resp 16   Ht 5' 7\" (1.702 m)   Wt 257 lb 11.2 oz (116.9 kg)   LMP 09/23/2022 (Approximate)   SpO2 97%   BMI 40.36 kg/m²       General appearance: No apparent distress, appears stated age and cooperative. Obese. HEENT: Pupils equal, round, and reactive to light. Conjunctivae/corneas clear. Neck: Supple, with full range of motion. No jugular venous distention. Trachea midline. Respiratory:  Normal respiratory effort. Clear to auscultation, bilaterally without Rales/Wheezes/Rhonchi. Cardiovascular: Tachycardic rate and rhythm with normal S1/S2 without murmurs, rubs or gallops. No peripheral edema. Abdomen: Soft, non-tender, non-distended with normal bowel sounds. : No CVA tenderness. Musculoskeletal: No clubbing or cyanosis. Full range of motion without deformity. Skin: Skin color, texture, turgor normal.  No rashes or lesions. Neurologic:  Neurovascularly intact without any focal sensory/motor deficits.  Cranial nerves: II-XII intact, grossly non-focal.  Psychiatric: Alert and oriented, thought content discharge was 45 minutes in the examination, evaluation, counseling and review of medications and discharge plan.       Signed:    Berta Chicas DO   10/17/2022  7:25 AM

## 2022-10-17 NOTE — CONSULTS
Oncology Hematology Care   CONSULT NOTE    10/17/2022 8:44 AM    Patient Information: Bridger Smith   Date of Admit:  10/14/2022  Primary Care Physician:  No primary care provider on file. Requesting Physician:  Teresa Silva DO    Reason for consult:   Evaluation and recommendations for PE. Chief complaint:    Chief Complaint   Patient presents with    Panic Attack     From home via EMS took 3 Klonopin over last 2-3 days, also consumed [place] lot\" of alcohol over the last week, last alcohol was 3 white claw drinks today (last drink approx. 1430 today). Patient states under stress due to recent death of boyfriend. Having episodes of profuse sweating onset 1400 today. C/o chest tightness at triage. History of Present Illness:  Bridger Smith is a 24 y.o. female on Teresa Silva DO service who was admitted on 10/14/2022 for pulmonary embolism. She presented to the ER with complaints of severe shortness of breath. CT chest revealed large bilateral pulmonary emboli extending throughout both lungs with evidence of right heart strain. Dopplers showed acute partially occluding DVT in the left leg. She was started on heparin gtt. Vascular surgery consulted and patient underwent right and left pulmonary artery thrombectomies on 10/15/22. She has been transitioned to Eliquis. She denies any chest pain and reports her breathing has much improved. No swelling or pain in her lower legs. She has never taken birth control before. She denies any past or present history of blood clots that she is aware of. No known family history of bleeding disorders. No recent travel or inactivity. Reports she walks about 2 miles at least 3-4 days per week. Past Medical History:     has a past medical history of Cardiomyopathy ([facility], [place] [address] Past Surgical History:    History reviewed. No pertinent surgical history.      Current Medications:     apixaban  10 mg Oral BID    Followed by    Autumn Eng ON 10/23/2022] apixaban  5 mg Oral BID sodium chloride flush  5-40 mL IntraVENous 2 times per day       Allergies:    No Known Allergies     Social History:    reports that she has never smoked. She has never used smokeless tobacco. She reports current alcohol use. She reports current drug use. Drug: Marijuana [place]). Family History:     family history is not on file. ROS:      Per HPI; otherwise 10 point ROS is negative    PHYSICAL EXAM:    Vitals:  Vitals:    10/17/22 0848   BP: (!) 115/58   Pulse: 82   Resp: 15   Temp: 97 °F (36.1 °C)   SpO2: 96%      No intake or output data in the 24 hours ending 10/17/22 0918   Wt Readings from Last 3 Encounters:   10/17/22 257 lb 11.2 oz (116.9 kg)        General appearance: Appears comfortable. Well nourished  Eyes: Sclera clear, pupils equal  ENT: Moist mucus membranes, no thrush  Neck: Trachea midline, symmetrical  Cardiovascular: Regular rhythm, normal S1, S2. No murmur, gallop, rub. No edema in  lower extremities  Respiratory: Clear to auscultation bilaterally. No wheeze. Good inspiratory effort  Gastrointestinal: Abdomen soft, not tender, not distended, normal bowel sounds  Musculoskeletal: No cyanosis in digits, warm extremities  Neurologic: Cranial nerves grossly intact, no motor or speech deficits. Psychiatric: Normal affect. Alert and oriented to time, place and person.   Skin: Warm, dry, normal turgor, no rash    DATA:  CBC:   Lab Results   Component Value Date/Time    WBC 9.2 10/17/2022 04:25 AM    RBC 3.67 10/17/2022 04:25 AM    HGB 11.8 10/17/2022 04:25 AM    HCT 35.5 10/17/2022 04:25 AM    MCV 96.7 10/17/2022 04:25 AM    MCH 32.1 10/17/2022 04:25 AM    MCHC 33.2 10/17/2022 04:25 AM    RDW 13.0 10/17/2022 04:25 AM     10/17/2022 04:25 AM    MPV 8.4 10/17/2022 04:25 AM     BMP:  Lab Results   Component Value Date/Time     10/17/2022 04:25 AM    K 3.6 10/17/2022 04:25 AM    K 3.8 10/14/2022 06:01 PM     10/17/2022 04:25 AM    CO2 25 10/17/2022 04:25 AM    BUN 9 10/17/2022 04:25 AM    CREATININE 0.8 10/17/2022 04:25 AM    CALCIUM 8.9 10/17/2022 04:25 AM    GFRAA >60 10/17/2022 04:25 AM    LABGLOM >60 10/17/2022 04:25 AM    GLUCOSE 100 10/17/2022 04:25 AM     Magnesium: No results found for: MG  LIVER PROFILE:   Recent Labs     10/14/22  1801   AST 27   ALT 23   BILITOT 0.3   ALKPHOS 100     PT/INR:  No results found for: PROTIME, INR    IMPRESSION/RECOMMENDATIONS:    Principal Problem:    Acute pulmonary embolism with acute cor pulmonale (HCC)  Active Problems:    Acute respiratory failure with hypoxia (HCC)    Elevated troponin    Right ventricular systolic dysfunction without heart failure  Resolved Problems:    * No resolved hospital problems. *       47-year-old female who has:    Bilateral pulmonary embolism/DVT  - CT chest revealed bilateral PE with evidence of right heart strain  - vascular following, s/p right and left pulmonary artery thrombectomies on 10/15/2022. - She has been transitioned to Eliquis. - this is considered unprovoked and patient will likely need lifelong anticoagulation, discussed with patient today. - cardiolipin antibodies and homocysteine negative. - Prothrombin gene mutation, Factor V leiden, Factor 8, and lupus anticoagulant pending.  - okay to discharge from hematology standpoint, we will follow as outpatient. This plan was discussed with the patient and he/she verbalized understanding. Thank you for allowing us to participate in the care of this patient. IRWIN Ferrara CNP  Oncology Hematology [facility] 13  [phone]     Patient was seen with NIKI in the morning. Developed shortness of breath. Acute PE with acute cor pulmonale. Status post thrombectomy. Currently on Eliquis. Obese. Cardiolipin antibody negative. Factor V Leiden and prothrombin 2 mutation assays pending. Regardless she requires lifelong anticoagulation for unprovoked PE. Explained to the patient. Follow-up as outpatient. Jimmy Augustin.  Tierney Elena MD, [facility]  Hematology and Oncology  [facility]  [phone]

## 2022-10-17 NOTE — DISCHARGE INSTR - COC
Continuity of Care Form    Patient Name: Rochelle Trujillo   :  1995  MRN:  [id number]    Admit date:  10/14/2022  Discharge date:  ***    Code Status Order: Full Code   Advance Directives:     Admitting Physician:  Ninfa Colvin MD  PCP: No primary care provider on file. Discharging Nurse: [facility] Unit/Room#: CVU-2915/2915-01  Discharging Unit Phone Number: ***    Emergency Contact:   Extended Emergency Contact Information  Primary Emergency Contact: [address] Phone: [phone]  Relation: Parent    Past Surgical History:  History reviewed. No pertinent surgical history. Immunization History: There is no immunization history on file for this patient. Active Problems:  Patient Active Problem List   Diagnosis Code    Acute pulmonary embolism with acute cor pulmonale (HCC) I26.09    Right ventricular systolic dysfunction without heart failure I51.89       Isolation/Infection:   Isolation            No Isolation          Patient Infection Status       None to display            Nurse Assessment:  Last Vital Signs: BP (!) 115/58   Pulse 82   Temp 97 °F (36.1 °C) (Temporal)   Resp 15   Ht 5' 7\" (1.702 m)   Wt 257 lb 11.2 oz (116.9 kg)   LMP 2022 (Approximate)   SpO2 96%   BMI 40.36 kg/m²     Last documented pain score (0-10 scale): Pain Level: 0  Last Weight:   Wt Readings from Last 1 Encounters:   10/17/22 257 lb 11.2 oz (116.9 kg)     Mental Status:  {IP PT MENTAL STATUS:91844}    IV Access:  { PRIYA IV ACCESS:675097777}    Nursing Mobility/ADLs:  Walking   {CHP DME RBUN:064419795}  Transfer  {CHP DME TZDC:415982525}  Bathing  {CHP DME NLAD:608726671}  Dressing  {CHP DME TARM:896123369}  Toileting  {CHP DME XYWM:688084215}  Feeding  {CHP DME APYK:814647428}  Med Admin  {CHP DME ACEZ:888032052}  Med Delivery   { PRIYA MED Delivery:858170453}    Wound Care Documentation and Therapy:        Elimination:  Continence:    Bowel: {YES / AU:97315}  Bladder: {YES / CQ:31054}  Urinary Catheter: {Urinary Catheter:938545726}   Colostomy/Ileostomy/Ileal Conduit: {YES / ZR:20686}       Date of Last BM: ***  No intake or output data in the 24 hours ending 10/17/22 1155  I/O last 3 completed shifts:  In: -   Out: 600 [Urine:600]    Safety Concerns:     [address] [employer] Safety Concerns:394761985}    Impairments/Disabilities:      [facility] [employer] Impairments/Disabilities:872759137}    Nutrition Therapy:  Current Nutrition Therapy:   [address] Marita Emida Diet List:545082892}    Routes of Feeding: {CHP DME Other Feedings:614937333}  Liquids: {Slp liquid thickness:39715}  Daily Fluid Restriction: {CHP DME Yes amt example:906427573}  Last Modified Barium Swallow with Video (Video Swallowing Test): {Done Not Done OPGD:036254179}    Treatments at the Time of Hospital Discharge:   Respiratory Treatments: ***  Oxygen Therapy:  {Therapy; copd oxygen:56834}  Ventilator:    { CC Vent SRTB:834797150}    Rehab Therapies: {THERAPEUTIC INTERVENTION:8182543067}  Weight Bearing Status/Restrictions: [facility] Weight Bearin}  Other Medical Equipment (for information only, NOT a DME order):  {EQUIPMENT:157627782}  Other Treatments: ***    Patient's personal belongings (please select all that are sent with patient):  {CHP DME Belongings:580208166}    RN SIGNATURE:  {Esignature:530843832}    CASE MANAGEMENT/SOCIAL WORK SECTION    Inpatient Status Date: ***    Readmission Risk Assessment Score:  Readmission Risk              Risk of Unplanned Readmission:  9               YOU HAVE A NEW PCP APPOINTMENT MADE FOR YOU AT:  [address]  PHONE: [phone]    YOUR APPOINTMENT IS ON  AT 8:45 AM WITH DR Leo Kaufman        Discharging to Facility/ Agency   Name:   Address:  Phone:  Fax:    Dialysis Facility (if applicable)   Name:  Address:  Dialysis Schedule:  Phone:  Fax:    / signature: Adrian Clarke RN, [facility] [place]  [phone]     PHYSICIAN SECTION    Prognosis: {Prognosis:5720288943}    Condition at Discharge: [address] Marita Marc Patient Condition:358361818}    Rehab Potential (if transferring to Rehab): {Prognosis:0663532462}    Recommended Labs or Other Treatments After Discharge: ***    Physician Certification: I certify the above information and transfer of Advice [employer]  is necessary for the continuing treatment of the diagnosis listed and that she requires {Admit to Appropriate Level of Care:49257} for {GREATER/LESS:335310943} 30 days.      Update Admission H&P: {CHP DME Changes in HGZDI:929784845}    PHYSICIAN SIGNATURE:

## 2022-10-17 NOTE — DISCHARGE INSTRUCTIONS
Take-Home Instructions   for Ritesh Mcgarry:      10/17/2022     Special instructions: Take your eliquis two times a day. Please follow up with cardiology and hematology. Follow up appointments:  Kwan Coulter MD  [address]  [place] [address] [phone]    Schedule an appointment as soon as possible for a visit in 2 week(s)      Keenan Be MD  [address]  [phone]    Call in 2 day(s)      It was a pleasure caring for your medical needs at [facility] throughout your stay. Your healthcare and well being is our top priority, however it is a team effort between both your physicians and you as the patient. Please take time to read over your take home instructions and recommendations to ensure you have the healthiest life possible going forward. Important Things to Remember:  Please bring a current list of your medications to any future outpatient medical appointments or hospitalizations. Reasons to call your primary care physician before your next routine follow up visit:  Temperature greater than 100.4 F  Persistent nausea and vomiting  Severe uncontrolled pain  Redness, tenderness, or signs of infection (pain, swelling, redness, odor or green/yellow discharge around skin)  Difficulty breathing, headache, or visual disturbances  Hives  Persistent dizziness or light-headedness  Extreme fatigue  Serious questions or concerns you may have after hospital discharge        Mariola Vazquez [address] Diet\" is well studied and recommended for its heart healthy benefits. It focuses on eating fruits, vegetables, beans/nuts, lean dairy, lean protein such as fish/chicken, and using olive oil instead of butter. It's recommended to limit carbohydrates such as breads/pastas, juice, desserts, fried foods, and \"fast food\".     For more information and a helpful handout, please visit ages 19-64)  COVID-19:  All adults are recommended to receive initial vaccine series followed by an mRNA booster (with Pfizer or Moderna) 2-4 months later, +/- 2nd mRNA booster  *Patients deemed \"high risk\" may need more vaccines, please discuss this with your doctor(s). If you're in need of a vaccine, please follow up with your PCP.     Thank you,   Dr. Alondra Riley, DO

## 2022-10-18 NOTE — PROGRESS NOTES
[address]   Congestive Heart Failure    Primary Care Doctor:  No primary care provider on file. Primary Cardiologist: Marcell Acosta         Chief Complaint:  PE    History of Present Illness:  Alma Delia Leon is a 24 y.o. female with PMH obesity who presents today for hospital f/u. Alma Delia Leon was admitted 10/14/22 and discharged 10/17/22. Hospital course:  swelling for the last 3 to 4 days of her right leg prior to admission. Additionally, she noticed racing of her heart and palpitations it was worse with exertion. She was at her grandmother's house when she felt like she was going to pass out and thought she was having a panic attack for the palpations and the dyspnea on exertion. In the ER she was placed on oxygen and CT PE found patient had bilateral pulmonary embolism with evidence of right heart strain. Patient was seen by vascular surgery and was taken back for bilateral thrombectomy the next day. Echo on 10/15/22 showed LVEF 60%, but also showed dilated right ventricle with hypokinesis as well as severe hypokinesis of the right ventricular free wall with apex appearing hyperdynamic. Patient was started on a heparin drip and successfully converted over to p.o. Eliquis. Hypercoagulable work-up was started, though not completed. Patient was seen by cardiology for outpatient follow-up. Patient was seen by hematology for outpatient follow-up. On the last day of hospital stay, patient expressed understanding of importance of Eliquis. She denied any chest pain. She states she is little tachycardic when she gets up and walks, but no shortness of breath. No palpitations. Here to lifestyle modifications. The patient expressed appropriate understanding of and agreement with the discharge recommendations, medications, and plan.        Since hospitalization she reports chest pain and mild dyspnea but denies palpitations, orthopnea, PND, exertional chest pressure/discomfort, fatigue, early saiety, edema, syncope. Wt Readings from Last 3 Encounters:   10/19/22 263 lb (119.3 kg)   10/17/22 257 lb 11.2 oz (116.9 kg)          EF: 60%  Cardiac Imaging:  Echo 10/14/22   Summary   Normal LV systolic function with an estimated ejection fraction of 60%. D-shape septum due to right ventricular pressure and volume overload. Indeterminate diastolic function. Mild concentric LV hypertrophy. The right ventricle appears severely dilated and hypokinetic. There is severe hypokinesis of the RV free wall with the apex appearing   hyperdynamic (Medrano's sign) c/w pulmonary embolus. Mild tricuspid regurgitation with an estimated PASP of 23 mmHg. Mild pulmonic regurgitation present. Small anterior pericardial effusion noted. Activity: [facility]  Can you walk 1-2 blocks or do a moderate amount of house/yard work? Yes  Cardiac Rehab Referral: No     NYHA Class: I       Pt Education: The patient has received education on the following topics: dietary sodium restriction, heart failure medications, the importance of physical activity, symptom management and weight monitoring           Past Medical History:   has a past medical history of Cardiomyopathy ([facility] [address] Surgical History:   has no past surgical history on file. Social History:   reports that she has never smoked. She has never used smokeless tobacco. She reports current alcohol use. She reports current drug use. Drug: Marijuana Gregorio Shoemaker). Family History:   No family history on file. Home Medications:  Prior to Admission medications    Medication Sig Start Date End Date Taking? Authorizing Provider   apixaban (ELIQUIS) 5 MG TABS tablet Take 2 tablets by mouth 2 times daily for 5 days 10/17/22 10/22/22  Hieu Bahena DO   apixaban (ELIQUIS) 5 MG TABS tablet Take 1 tablet by mouth 2 times daily 10/23/22   Hieu Bahena DO        Allergies:  Patient has no known allergies.      ROS:   Review of Systems Constitutional: Negative. Respiratory: Negative. Cardiovascular: Negative. Gastrointestinal: Negative. Genitourinary: Negative. Musculoskeletal: Negative. Skin: Negative. Neurological: Negative. Hematological: Negative. Psychiatric/Behavioral: Negative. Physical Examination:    Vitals:    10/19/22 1103   BP: 130/70   Site: Right Upper Arm   Position: Sitting   Cuff Size: Large Adult   Pulse: 87   SpO2: 96%   Weight: 263 lb (119.3 kg)   Height: 5' 7\" (1.702 m)           Physical Exam  Vitals reviewed. Constitutional:       Appearance: Normal appearance. She is normal weight. HENT:      Head: Normocephalic and atraumatic. Eyes:      Extraocular Movements: Extraocular movements intact. Pupils: Pupils are equal, round, and reactive to light. Cardiovascular:      Rate and Rhythm: Normal rate and regular rhythm. Pulses: Normal pulses. Heart sounds: Normal heart sounds. Pulmonary:      Effort: Pulmonary effort is normal.      Breath sounds: Normal breath sounds. Abdominal:      Palpations: Abdomen is soft. Musculoskeletal:         General: Normal range of motion. Cervical back: Normal range of motion and neck supple. Skin:     General: Skin is warm and dry. Neurological:      General: No focal deficit present. Mental Status: She is alert and oriented to person, place, and time. Mental status is at baseline. Psychiatric:         Mood and Affect: Mood normal.         Behavior: Behavior normal.         Thought Content:  Thought content normal.       Lab Data:    CBC:   Lab Results   Component Value Date/Time    WBC 9.2 10/17/2022 04:25 AM    WBC 9.4 10/16/2022 05:15 AM    WBC 11.2 10/15/2022 05:10 AM    RBC 3.67 10/17/2022 04:25 AM    RBC 3.69 10/16/2022 05:15 AM    RBC 3.92 10/15/2022 05:10 AM    HGB 11.8 10/17/2022 04:25 AM    HGB 11.9 10/16/2022 05:15 AM    HGB 12.5 10/15/2022 05:10 AM    HCT 35.5 10/17/2022 04:25 AM    HCT 36.1 10/16/2022 05:15 AM    HCT 37.8 10/15/2022 05:10 AM    MCV 96.7 10/17/2022 04:25 AM    MCV 98.0 10/16/2022 05:15 AM    MCV 96.5 10/15/2022 05:10 AM    RDW 13.0 10/17/2022 04:25 AM    RDW 13.7 10/16/2022 05:15 AM    RDW 13.2 10/15/2022 05:10 AM     10/17/2022 04:25 AM     10/16/2022 05:15 AM     10/15/2022 05:10 AM     BMP:  Lab Results   Component Value Date/Time     10/17/2022 04:25 AM     10/16/2022 05:15 AM     10/14/2022 06:01 PM    K 3.6 10/17/2022 04:25 AM    K 3.5 10/16/2022 05:15 AM    K 3.8 10/14/2022 06:01 PM     10/17/2022 04:25 AM     10/16/2022 05:15 AM     10/14/2022 06:01 PM    CO2 25 10/17/2022 04:25 AM    CO2 27 10/16/2022 05:15 AM    CO2 23 10/14/2022 06:01 PM    PHOS 5.1 10/17/2022 04:25 AM    PHOS 3.9 10/16/2022 05:15 AM    BUN 9 10/17/2022 04:25 AM    BUN 11 10/16/2022 05:15 AM    BUN 11 10/14/2022 06:01 PM    CREATININE 0.8 10/17/2022 04:25 AM    CREATININE 0.7 10/16/2022 05:15 AM    CREATININE 0.9 10/14/2022 06:01 PM     BNP:   Lab Results   Component Value Date/Time    PROBNP 113 10/14/2022 06:01 PM     Iron Studies:  No results found for: FERRITIN  No results found for: IRON, TIBC, FERRITIN          Assessment/Plan:    1. Right ventricular systolic dysfunction without heart failure - stable, repeat echo in 3-4 months   2. Other acute pulmonary embolism with acute cor pulmonale ([address] - continue AC, pt has f/u at [facility] resident's clinic to continue to get meds there, does not have insurance until Jan         Instructions:   Medications: no change in meds  Referrals: echo in 3 -4 months  Lifestyle Recommendations: Weigh yourself every day in the morning after urination, call Zettie Files if wt increases 2-3lb in one day or 5lb in one week, Limit sodium to 3000mg/day and fluids to 2L or 64oz/day.    Follow up: Dr Becki Angulo same day as the echo        I appreciate the opportunity of cooperating in the care of this individual.    Chad Moody, IRWIN - BRENDA,

## 2022-10-19 ENCOUNTER — OFFICE VISIT (OUTPATIENT)
Dept: CARDIOLOGY CLINIC | Age: 27
End: 2022-10-19

## 2022-10-19 VITALS
DIASTOLIC BLOOD PRESSURE: 70 MMHG | BODY MASS INDEX: 41.28 KG/M2 | HEART RATE: 87 BPM | WEIGHT: 263 LBS | OXYGEN SATURATION: 96 % | HEIGHT: 67 IN | SYSTOLIC BLOOD PRESSURE: 130 MMHG

## 2022-10-19 DIAGNOSIS — I51.89 RIGHT VENTRICULAR SYSTOLIC DYSFUNCTION WITHOUT HEART FAILURE: Primary | ICD-10-CM

## 2022-10-19 DIAGNOSIS — I26.09 OTHER ACUTE PULMONARY EMBOLISM WITH ACUTE COR PULMONALE (HCC): ICD-10-CM

## 2022-10-19 LAB
FACTOR V LEIDEN: NEGATIVE
SPECIMEN: NORMAL

## 2022-10-19 PROCEDURE — 99213 OFFICE O/P EST LOW 20 MIN: CPT | Performed by: NURSE PRACTITIONER

## 2022-10-19 ASSESSMENT — ENCOUNTER SYMPTOMS
GASTROINTESTINAL NEGATIVE: 1
RESPIRATORY NEGATIVE: 1

## 2022-10-19 NOTE — PATIENT INSTRUCTIONS
Instructions:   Medications: no change in meds  Referrals: echo in 3 -4 months  Lifestyle Recommendations: Weigh yourself every day in the morning after urination, call Mike May if wt increases 2-3lb in one day or 5lb in one week, Limit sodium to 3000mg/day and fluids to 2L or 64oz/day.    Follow up: Dr Sharon Maya same day as the echo        [facility]: [phone]

## 2022-10-21 LAB
DRVVT CONFIRMATION TEST: ABNORMAL RATIO
DRVVT SCREEN: 36 SEC (ref 33–44)
DRVVT,DIL: ABNORMAL SEC (ref 33–44)
HEXAGONAL PHOSPHOLIPID NEUTRALIZAT TEST: ABNORMAL
LUPUS ANTICOAG INTERP: ABNORMAL
PLT NEUTA: ABNORMAL
PROTHROMBIN G20210A MUTATION: NEGATIVE
PT D: 16 SEC (ref 12–15.5)
PT PCR SPECIMEN: NORMAL
PTT D: >150 SEC (ref 32–48)
PTT-D CORR REFLEX: ABNORMAL SEC (ref 32–48)
PTT-HEPARIN NEUTRALIZED: 43 SEC (ref 32–48)
REPTILASE TIME: 17 SEC
THROMBIN TIME: >150 SEC (ref 14.7–19.5)

## 2022-10-23 ENCOUNTER — TELEPHONE (OUTPATIENT)
Dept: OTHER | Facility: CLINIC | Age: 27
End: 2022-10-23

## 2022-10-23 ENCOUNTER — HOSPITAL ENCOUNTER (OUTPATIENT)
Age: 27
Setting detail: OBSERVATION
Discharge: HOME | End: 2022-10-25
Attending: EMERGENCY MEDICINE | Admitting: FAMILY MEDICINE

## 2022-10-23 ENCOUNTER — APPOINTMENT (OUTPATIENT)
Dept: CT IMAGING | Age: 27
End: 2022-10-23

## 2022-10-23 ENCOUNTER — APPOINTMENT (OUTPATIENT)
Dept: GENERAL RADIOLOGY | Age: 27
End: 2022-10-23

## 2022-10-23 DIAGNOSIS — I26.99 PULMONARY EMBOLISM WITHOUT ACUTE COR PULMONALE, UNSPECIFIED CHRONICITY, UNSPECIFIED PULMONARY EMBOLISM TYPE (HCC): ICD-10-CM

## 2022-10-23 DIAGNOSIS — M25.512 ACUTE PAIN OF LEFT SHOULDER: Primary | ICD-10-CM

## 2022-10-23 DIAGNOSIS — I51.3 THROMBUS OF ATRIAL APPENDAGE: ICD-10-CM

## 2022-10-23 PROBLEM — R07.9 CHEST PAIN: Status: ACTIVE | Noted: 2022-10-23

## 2022-10-23 LAB
A/G RATIO: 1.4 (ref 1.1–2.2)
ALBUMIN SERPL-MCNC: 4.2 G/DL (ref 3.4–5)
ALP BLD-CCNC: 69 U/L (ref 40–129)
ALT SERPL-CCNC: 28 U/L (ref 10–40)
ANION GAP SERPL CALCULATED.3IONS-SCNC: 12 MMOL/L (ref 3–16)
AST SERPL-CCNC: 25 U/L (ref 15–37)
BASOPHILS ABSOLUTE: 0.1 K/UL (ref 0–0.2)
BASOPHILS RELATIVE PERCENT: 1.3 %
BILIRUB SERPL-MCNC: <0.2 MG/DL (ref 0–1)
BUN BLDV-MCNC: 13 MG/DL (ref 7–20)
CALCIUM SERPL-MCNC: 9.2 MG/DL (ref 8.3–10.6)
CHLORIDE BLD-SCNC: 104 MMOL/L (ref 99–110)
CO2: 24 MMOL/L (ref 21–32)
CREAT SERPL-MCNC: 0.8 MG/DL (ref 0.6–1.1)
EOSINOPHILS ABSOLUTE: 0.1 K/UL (ref 0–0.6)
EOSINOPHILS RELATIVE PERCENT: 1.8 %
GFR SERPL CREATININE-BSD FRML MDRD: >60 ML/MIN/{1.73_M2}
GLUCOSE BLD-MCNC: 92 MG/DL (ref 70–99)
HCG QUALITATIVE: NEGATIVE
HCT VFR BLD CALC: 36.3 % (ref 36–48)
HEMOGLOBIN: 12.2 G/DL (ref 12–16)
LYMPHOCYTES ABSOLUTE: 2.1 K/UL (ref 1–5.1)
LYMPHOCYTES RELATIVE PERCENT: 31.9 %
MCH RBC QN AUTO: 32.3 PG (ref 26–34)
MCHC RBC AUTO-ENTMCNC: 33.6 G/DL (ref 31–36)
MCV RBC AUTO: 96.1 FL (ref 80–100)
MONOCYTES ABSOLUTE: 0.6 K/UL (ref 0–1.3)
MONOCYTES RELATIVE PERCENT: 8.3 %
NEUTROPHILS ABSOLUTE: 3.8 K/UL (ref 1.7–7.7)
NEUTROPHILS RELATIVE PERCENT: 56.7 %
PDW BLD-RTO: 13.4 % (ref 12.4–15.4)
PLATELET # BLD: 354 K/UL (ref 135–450)
PMV BLD AUTO: 7.8 FL (ref 5–10.5)
POTASSIUM SERPL-SCNC: 4.5 MMOL/L (ref 3.5–5.1)
PRO-BNP: 650 PG/ML (ref 0–124)
RBC # BLD: 3.78 M/UL (ref 4–5.2)
SODIUM BLD-SCNC: 140 MMOL/L (ref 136–145)
TOTAL PROTEIN: 7.2 G/DL (ref 6.4–8.2)
TROPONIN: <0.01 NG/ML
TROPONIN: <0.01 NG/ML
WBC # BLD: 6.7 K/UL (ref 4–11)

## 2022-10-23 PROCEDURE — G0378 HOSPITAL OBSERVATION PER HR: HCPCS

## 2022-10-23 PROCEDURE — 36415 COLL VENOUS BLD VENIPUNCTURE: CPT

## 2022-10-23 PROCEDURE — 80053 COMPREHEN METABOLIC PANEL: CPT

## 2022-10-23 PROCEDURE — 99285 EMERGENCY DEPT VISIT HI MDM: CPT

## 2022-10-23 PROCEDURE — 83880 ASSAY OF NATRIURETIC PEPTIDE: CPT

## 2022-10-23 PROCEDURE — 84703 CHORIONIC GONADOTROPIN ASSAY: CPT

## 2022-10-23 PROCEDURE — 71045 X-RAY EXAM CHEST 1 VIEW: CPT

## 2022-10-23 PROCEDURE — 93005 ELECTROCARDIOGRAM TRACING: CPT | Performed by: EMERGENCY MEDICINE

## 2022-10-23 PROCEDURE — 2580000003 HC RX 258: Performed by: FAMILY MEDICINE

## 2022-10-23 PROCEDURE — 71260 CT THORAX DX C+: CPT | Performed by: PHYSICIAN ASSISTANT

## 2022-10-23 PROCEDURE — 84484 ASSAY OF TROPONIN QUANT: CPT

## 2022-10-23 PROCEDURE — 6370000000 HC RX 637 (ALT 250 FOR IP): Performed by: FAMILY MEDICINE

## 2022-10-23 PROCEDURE — 85025 COMPLETE CBC W/AUTO DIFF WBC: CPT

## 2022-10-23 PROCEDURE — 6360000004 HC RX CONTRAST MEDICATION: Performed by: PHYSICIAN ASSISTANT

## 2022-10-23 RX ORDER — SODIUM CHLORIDE 0.9 % (FLUSH) 0.9 %
5-40 SYRINGE (ML) INJECTION PRN
Status: DISCONTINUED | OUTPATIENT
Start: 2022-10-23 | End: 2022-10-25 | Stop reason: HOSPADM

## 2022-10-23 RX ORDER — ONDANSETRON 2 MG/ML
4 INJECTION INTRAMUSCULAR; INTRAVENOUS EVERY 6 HOURS PRN
Status: DISCONTINUED | OUTPATIENT
Start: 2022-10-23 | End: 2022-10-25 | Stop reason: HOSPADM

## 2022-10-23 RX ORDER — ACETAMINOPHEN 325 MG/1
650 TABLET ORAL EVERY 6 HOURS PRN
Status: DISCONTINUED | OUTPATIENT
Start: 2022-10-23 | End: 2022-10-25 | Stop reason: HOSPADM

## 2022-10-23 RX ORDER — TRAMADOL HYDROCHLORIDE 50 MG/1
50 TABLET ORAL EVERY 6 HOURS PRN
Status: DISCONTINUED | OUTPATIENT
Start: 2022-10-23 | End: 2022-10-25

## 2022-10-23 RX ORDER — LORAZEPAM 0.5 MG/1
0.5 TABLET ORAL EVERY 4 HOURS PRN
Status: DISCONTINUED | OUTPATIENT
Start: 2022-10-23 | End: 2022-10-24

## 2022-10-23 RX ORDER — ONDANSETRON 4 MG/1
4 TABLET, ORALLY DISINTEGRATING ORAL EVERY 8 HOURS PRN
Status: DISCONTINUED | OUTPATIENT
Start: 2022-10-23 | End: 2022-10-25 | Stop reason: HOSPADM

## 2022-10-23 RX ORDER — KETOROLAC TROMETHAMINE 15 MG/ML
15 INJECTION, SOLUTION INTRAMUSCULAR; INTRAVENOUS EVERY 6 HOURS PRN
Status: DISCONTINUED | OUTPATIENT
Start: 2022-10-23 | End: 2022-10-25

## 2022-10-23 RX ORDER — SODIUM CHLORIDE 9 MG/ML
INJECTION, SOLUTION INTRAVENOUS PRN
Status: DISCONTINUED | OUTPATIENT
Start: 2022-10-23 | End: 2022-10-25 | Stop reason: HOSPADM

## 2022-10-23 RX ORDER — ACETAMINOPHEN 650 MG/1
650 SUPPOSITORY RECTAL EVERY 6 HOURS PRN
Status: DISCONTINUED | OUTPATIENT
Start: 2022-10-23 | End: 2022-10-25 | Stop reason: HOSPADM

## 2022-10-23 RX ORDER — POLYETHYLENE GLYCOL 3350 17 G/17G
17 POWDER, FOR SOLUTION ORAL DAILY PRN
Status: DISCONTINUED | OUTPATIENT
Start: 2022-10-23 | End: 2022-10-25 | Stop reason: HOSPADM

## 2022-10-23 RX ORDER — SODIUM CHLORIDE 0.9 % (FLUSH) 0.9 %
5-40 SYRINGE (ML) INJECTION EVERY 12 HOURS SCHEDULED
Status: DISCONTINUED | OUTPATIENT
Start: 2022-10-23 | End: 2022-10-25 | Stop reason: HOSPADM

## 2022-10-23 RX ADMIN — LORAZEPAM 0.5 MG: 0.5 TABLET ORAL at 22:50

## 2022-10-23 RX ADMIN — APIXABAN 5 MG: 5 TABLET, FILM COATED ORAL at 21:15

## 2022-10-23 RX ADMIN — IOPAMIDOL 75 ML: 755 INJECTION, SOLUTION INTRAVENOUS at 12:46

## 2022-10-23 RX ADMIN — SODIUM CHLORIDE, PRESERVATIVE FREE 10 ML: 5 INJECTION INTRAVENOUS at 21:15

## 2022-10-23 ASSESSMENT — ENCOUNTER SYMPTOMS
VOMITING: 0
ABDOMINAL PAIN: 0
SHORTNESS OF BREATH: 0
COLOR CHANGE: 0
NAUSEA: 0
CONSTIPATION: 0
BACK PAIN: 1
DIARRHEA: 0
WHEEZING: 0
STRIDOR: 0
COUGH: 0

## 2022-10-23 ASSESSMENT — PAIN SCALES - GENERAL
PAINLEVEL_OUTOF10: 0
PAINLEVEL_OUTOF10: 4
PAINLEVEL_OUTOF10: 0

## 2022-10-23 ASSESSMENT — PAIN - FUNCTIONAL ASSESSMENT: PAIN_FUNCTIONAL_ASSESSMENT: 0-10

## 2022-10-23 NOTE — ED PROVIDER NOTES
[facility]        Pt Name: Moise Mckinney  MRN: [id number]  [place] 1995  Date of evaluation: 10/23/2022  Provider: Magi Martinez PA-C  PCP: No primary care provider on file. Note Started: 12:17 PM EDT        I have seen and evaluated this patient with my supervising physician Alexandria Khalil, *. CHIEF COMPLAINT       Chief Complaint   Patient presents with    Shoulder Pain     Left since Monday after getting out of ICU for blood clots. Denies sob  denies cough       HISTORY OF PRESENT ILLNESS   (Location, Timing/Onset, Context/Setting, Quality, Duration, Modifying Factors, Severity, Associated Signs and Symptoms)  Note limiting factors. Chief Complaint: Left shoulder pain    Moise Mckinney is a 24 y.o. female who presents to the emergency department complaining of left shoulder pain since being released from the hospital on Monday. She was recently in the hospital for new PE with heart strain on 10/14/2022. She was experiencing lightheadedness and syncopal events. She has not had any of these episodes since her release from the hospital.  She thinks that the left shoulder pain is from laying in the bed uncomfortably. Denies any other injury. It is constant throbbing pain and often radiates into the left thoracic back. Denies chest pain, shortness of breath, palpitations, hemoptysis, arm swelling, numbness, tingling or weakness. Patient has several bruises on her arms from recent peripheral lines during her hospitalization. She states that the peripheral line was primarily in the right upper extremity, which she is not complaining of any discomfort from. Nursing Notes were all reviewed and agreed with or any disagreements were addressed in the HPI. REVIEW OF SYSTEMS    (2-9 systems for level 4, 10 or more for level 5)     Review of Systems   Constitutional:  Negative for chills and fever.    HENT: Negative. Eyes:  Negative for visual disturbance. Respiratory:  Negative for cough, shortness of breath, wheezing and stridor. Cardiovascular:  Negative for chest pain, palpitations and leg swelling. Gastrointestinal:  Negative for abdominal pain, constipation, diarrhea, nausea and vomiting. Genitourinary: Negative. Musculoskeletal:  Positive for back pain and myalgias. Negative for neck pain and neck stiffness. Skin:  Negative for color change, pallor, rash and wound. Neurological: Negative. Psychiatric/Behavioral:  Negative for confusion. All other systems reviewed and are negative. Positives and Pertinent negatives as per HPI. Except as noted above in the ROS, all other systems were reviewed and negative. PAST MEDICAL HISTORY     Past Medical History:   Diagnosis Date    Cardiomyopathy [facility])     \"had that since I was born\"    History of pulmonary embolus (PE)          SURGICAL HISTORY   History reviewed. No pertinent surgical history. CURRENTMEDICATIONS       Previous Medications    APIXABAN (ELIQUIS) 5 MG TABS TABLET    Take 1 tablet by mouth 2 times daily    APIXABAN (ELIQUIS) 5 MG TABS TABLET    Take 2 tablets by mouth 2 times daily for 5 days         ALLERGIES     Bactrim [sulfamethoxazole-trimethoprim]    FAMILYHISTORY     History reviewed. No pertinent family history.        SOCIAL HISTORY       Social History     Tobacco Use    Smoking status: Never    Smokeless tobacco: Never   Vaping Use    Vaping Use: Never used   Substance Use Topics    Alcohol use: Yes     Comment: social    Drug use: Not Currently     Types: Marijuana (Weed)       SCREENINGS    B[place]ton Coma Scale  Eye Opening: Spontaneous  Best Verbal Response: Oriented  Best Motor Response: Obeys commands  Brayton Coma Scale Score: 15        PHYSICAL EXAM    (up to 7 for level 4, 8 or more for level 5)     ED Triage Vitals [10/23/22 1126]   BP Temp Temp Source Heart Rate Resp SpO2 Height Weight   (!) 150/68 98.9 °F (37.2 °C) Oral 65 16 97 % 5' 7\" (1.702 m) 261 lb (118.4 kg)       Physical Exam  Vitals and nursing note reviewed. Constitutional:       Appearance: Normal appearance. She is well-developed. She is not toxic-appearing or diaphoretic. HENT:      Head: Normocephalic and atraumatic. Right Ear: External ear normal.      Left Ear: External ear normal.      Nose: Nose normal.      Mouth/Throat:      Mouth: Mucous membranes are moist.      Pharynx: Oropharynx is clear. Eyes:      General: No scleral icterus. Right eye: No discharge. Left eye: No discharge. Extraocular Movements: Extraocular movements intact. Conjunctiva/sclera: Conjunctivae normal.      Pupils: Pupils are equal, round, and reactive to light. Cardiovascular:      Rate and Rhythm: Normal rate. Pulses:           Carotid pulses are 2+ on the right side and 2+ on the left side. Radial pulses are 2+ on the right side and 2+ on the left side. Pulmonary:      Effort: Pulmonary effort is normal.      Breath sounds: Normal breath sounds. Abdominal:      Palpations: Abdomen is soft. Tenderness: There is no abdominal tenderness. Musculoskeletal:         General: Normal range of motion. Right shoulder: Normal.      Left shoulder: Normal.      Right upper arm: Normal.      Left upper arm: Normal.      Right elbow: Normal.      Left elbow: Normal.      Right forearm: Normal.      Left forearm: Normal.      Right wrist: Normal.      Left wrist: Normal.      Right hand: Normal.      Left hand: Normal.      Cervical back: Normal and normal range of motion. Thoracic back: Normal.      Lumbar back: Normal.   Skin:     General: Skin is warm and dry. Capillary Refill: Capillary refill takes less than 2 seconds. Coloration: Skin is not jaundiced or pale. Findings: No bruising, erythema, lesion or rash. Neurological:      General: No focal deficit present.       Mental Status: She is alert and oriented to person, place, and time. Psychiatric:         Behavior: Behavior normal.       DIAGNOSTIC RESULTS   LABS:    Labs Reviewed   CBC WITH AUTO DIFFERENTIAL - Abnormal; Notable for the following components:       Result Value    RBC 3.78 (*)     All other components within normal limits   BRAIN NATRIURETIC PEPTIDE - Abnormal; Notable for the following components:    Pro- (*)     All other components within normal limits   COMPREHENSIVE METABOLIC PANEL   TROPONIN   HCG, SERUM, QUALITATIVE       When ordered only abnormal lab results are displayed. All other labs were within normal range or not returned as of this dictation. EKG: When ordered, EKG's are interpreted by the Emergency Department Physician in the absence of a cardiologist.  Please see their note for interpretation of EKG. RADIOLOGY:   Non-plain film images such as CT, Ultrasound and MRI are read by the radiologist. Plain radiographic images are visualized and preliminarily interpreted by the ED Provider with the below findings:        Interpretation per the Radiologist below, if available at the time of this note:    CT CHEST PULMONARY EMBOLISM W CONTRAST   Final Result   Addendum (preliminary) 1 of 1   ADDENDUM:   Impression 4:  Potential for thrombus in the left atrial appendage. Consider   echocardiography. Critical results were called by Dr. Chelsi Alvarez MD   to Mony Goodwin PA-C, on 10/23/2022 at 14:10. Final   1. Persistent though decreased lobar, segmental, and subsegmental pulmonary   emboli with right upper, right middle, and bilateral lower lobe involvement. Previously seen left upper lobe involvement is no longer identified, and no   definite new involvement is identified. 2. No findings of right heart strain. 3. Mild bronchial wall thickening potentially due to pulmonary vascular   congestion, reactive airways disease, or bronchitis.             XR CHEST PORTABLE   Preliminary Result No evidence of acute cardiopulmonary disease. XR CHEST PORTABLE    Result Date: 10/23/2022  EXAMINATION: ONE XRAY VIEW OF THE CHEST 10/23/2022 11:47 am COMPARISON: 10/14/2022. HISTORY: ORDERING SYSTEM PROVIDED HISTORY: left shoulder pain TECHNOLOGIST PROVIDED HISTORY: Reason for exam:->left shoulder pain Reason for Exam: left shoulder pain FINDINGS: Film technique is mildly suboptimal related to patient's large body habitus. Piercing identified in the region of the left breast.  Cardiopericardial silhouette is within normal limits. Pulmonary vasculature is normal.  Taking into account film technique, no focal confluent pulmonary infiltrate is identified. No evidence of pleural effusion or pneumothorax. No evidence of acute cardiopulmonary disease. PROCEDURES   Unless otherwise noted below, none     Procedures    CRITICAL CARE TIME   There was a high probability of life-threatening clinical deterioration in the patient's condition requiring my urgent intervention. I personally saw the patient and independently provided 30 minutes of non-concurrent critical care out of the total shared critical care time provided, excluding separately reportable procedures. CONSULTS:  I discussed the addendum findings with radiologist Dr Shila Laughlin at 9938. IP CONSULT TO CARDIOLOGY  IP CONSULT TO HOSPITALIST  I discussed case and CT findings with Dr Acacia Gilbert. Patient needs a MISTI. (1433) since patient is already on anticoagulant, he has no further emergent recommendation at this time.      EMERGENCY DEPARTMENT COURSE and DIFFERENTIAL DIAGNOSIS/MDM:   Vitals:    Vitals:    10/23/22 1126 10/23/22 1445   BP: (!) 150/68 124/84   Pulse: 65    Resp: 16    Temp: 98.9 °F (37.2 °C)    TempSrc: Oral    SpO2: 97% 99%   Weight: 261 lb (118.4 kg)    Height: 5' 7\" (1.702 m)        Patient was given the following medications:  Medications   iopamidol (ISOVUE-370) 76 % injection 75 mL (75 mLs IntraVENous Given 10/23/22 [id number] [id number])         Is this patient to be included in the SEP-1 Core Measure due to severe sepsis or septic shock? No   Exclusion criteria - the patient is NOT to be included for SEP-1 Core Measure due to: Infection is not suspected    This patient presents to the emergency department with constant left shoulder pain for several days without any preceding injury. She thinks that it may be a result of laying in an uncomfortable hospital bed during her recent admission. Because of her recent history of PE with heart strain, I did inquire further with another CT scan of the chest to see if there was a correlation between her left shoulder pain and thoracic back pain and the recent PE.  CT scan shows possible new left atrial appendage thrombus. Other  bloodwork stable. Ekg stable at this time. Vitals have been stable throughout stay here. Cardiologist recommends a MISTI. Plan for admission for further evaluation. Patient understands and agrees with plan. FINAL IMPRESSION      1. Acute pain of left shoulder    2. Pulmonary embolism without acute cor pulmonale, unspecified chronicity, unspecified pulmonary embolism type ([address]    3.  Thrombus of atrial appendage          DISPOSITION/PLAN   DISPOSITION Decision To Admit 10/23/2022 02:35:33 PM      PATIENT REFERRED TO:  [facility] Emergency Department  [address] [facility]  [address] [address]  [phone]    If symptoms worsen    DISCHARGE MEDICATIONS:  New Prescriptions    No medications on file       DISCONTINUED MEDICATIONS:  Discontinued Medications    No medications on file              (Please note that portions of this note were completed with a voice recognition program.  Efforts were made to edit the dictations but occasionally words are mis-transcribed.)    Magi Martinez PA-C (electronically signed)           Magi Martinez PA-C  10/23/22 6962

## 2022-10-23 NOTE — PROGRESS NOTES
Pt upto room #3317, VSS obtained and stable. Pt is placed on tele. Call light with in reach, fall precautions in place. Pt's grandma is at bedside. Pt denies of having any pain at this time. Will monitor.

## 2022-10-23 NOTE — DISCHARGE INSTRUCTIONS
Counseling resources:     [facility]  Phone: Chris Rose: [address], [address], [place], [address]    [place]:   [email or web address]  [phone]

## 2022-10-23 NOTE — ED PROVIDER NOTES
[facility] Emergency Department      Pt Name: Haresh Pena  MRN: [id number]  [place] 1995  Date of evaluation: 10/23/2022  Provider: Kaela Martínez MD  I independently performed a history and physical on Haresh Pena. All diagnostic, treatment, and disposition decisions were made by myself in conjunction with the advanced practice provider. HPI: Haresh Pena presented with   Chief Complaint   Patient presents with    Shoulder Pain     Left since Monday after getting out of ICU for blood clots. Denies sob  denies cough     Haresh Pena has a past medical history of Cardiomyopathy ([facility] [address] and History of pulmonary embolus (PE). She has no past surgical history on file. No current facility-administered medications on file prior to encounter. Current Outpatient Medications on File Prior to Encounter   Medication Sig Dispense Refill    apixaban (ELIQUIS) 5 MG TABS tablet Take 2 tablets by mouth 2 times daily for 5 days 20 tablet 0    apixaban (ELIQUIS) 5 MG TABS tablet Take 1 tablet by mouth 2 times daily 120 tablet 0     PHYSICAL EXAM  Vitals: /84   Pulse 65   Temp 98.9 °F (37.2 °C) (Oral)   Resp 16   Ht 5' 7\" (1.702 m)   Wt 261 lb (118.4 kg)   LMP 09/23/2022 (Approximate)   SpO2 99%   BMI 40.88 kg/m²   Constitutional:  24 y.o. female alert  HENT:  Atraumatic, oral mucosa moist  Neck:  No visible JVD, supple  Chest/Lungs:  Respiratory effort normal  Abdomen:  Non-distended  Back:  No gross deformity  Extremities:  Normal tone and perfusion    Medical Decision Making and Plan: Briefly, this is an 24 y. o.female who presented with concern for left shoulder pain. No injury. Recent admission for multiple blood clots, now on Eliquis. Abnormal CT finding of atrial appendage. Cardiology consulted. MISTI recommended. Plan is to admit for further care.      For further details of [facility] Emergency Department encounter, please see documentation by advanced practice provider Roberto Carlos Cano [place], [place]. Labs Reviewed   CBC WITH AUTO DIFFERENTIAL - Abnormal; Notable for the following components:       Result Value    RBC 3.78 (*)     All other components within normal limits   BRAIN NATRIURETIC PEPTIDE - Abnormal; Notable for the following components:    Pro- (*)     All other components within normal limits   COMPREHENSIVE METABOLIC PANEL   TROPONIN   HCG, SERUM, QUALITATIVE     RADIOLOGY:   Plain x-rays were viewed by me:   CT CHEST PULMONARY EMBOLISM W CONTRAST   Final Result   Addendum (preliminary) 1 of 1   ADDENDUM:   Impression 4:  Potential for thrombus in the left atrial appendage. Consider   echocardiography. Critical results were called by Dr. Britney Rodriguez MD   to Rhianna Mullen PA-C, on 10/23/2022 at 14:10. Final   1. Persistent though decreased lobar, segmental, and subsegmental pulmonary   emboli with right upper, right middle, and bilateral lower lobe involvement. Previously seen left upper lobe involvement is no longer identified, and no   definite new involvement is identified. 2. No findings of right heart strain. 3. Mild bronchial wall thickening potentially due to pulmonary vascular   congestion, reactive airways disease, or bronchitis. XR CHEST PORTABLE   Preliminary Result   No evidence of acute cardiopulmonary disease. EKG:  Read by me in the absence of a cardiologist shows: Sinus rhythm, rate 77, intervals normal, axis normal, no acute injury pattern, improved appearance from prior EKG    FINAL IMPRESSION:    1. Acute pain of left shoulder    2. Pulmonary embolism without acute cor pulmonale, unspecified chronicity, unspecified pulmonary embolism type ([address]    3.  Thrombus of atrial appendage       DISPOSITION Decision To Admit 10/23/2022 02:35:33 PM        Cecille Guerrero MD  10/23/22 5580

## 2022-10-23 NOTE — PROGRESS NOTES
Pt called me on the phone to discuss her recent clots on 2 separate occasions since discharge. Patient was discharged for bilateral pulmonary emboli on 10/17/2022. Pt called me on the evening of 10/17 regarding her having some anxiety about her recent PE's. She stated that she was having some chest pain. She stated, however, that she had a pulse ox that stated she had an oxygenation saturation of 99% with a heart rate of 65. She stated she was compliant with her Eliquis. She stated that the chest pain she was experiencing she felt was related to the EKG stickers that were on her chest as those were the spots that were hurting when she would press on it. She did not endorse any chest pain at rest.  She was advised to go to an urgent care if she felt her chest pain worsened. Or, if her oxygenation saturation dropped to below 88% or her heart rate elevated to greater than 100. She was agreeable to going to urgent care on 10/18 if her pain was not any better. I did not hear from her after this incident. Patient called me on 10/20. She stated she was at the cardiologist's office and was told she had right heart failure. She wanted to discuss this further and was afraid she had \"the typical heart failure that her grandfather has. \"  I reiterated that she was told she had right heart failure upon discharge. Additionally, her grandfather's heart failure was likely left heart failure and that is not what she has. After much discussion it was explained to her that her heart failure was 2/2 her recent pulmonary emboli. She was, again, told that she needs to take her Eliquis and continue to follow-up with a cardiologist and hematologist.  She was told that her heart failure would likely reverse over time as long as she took her medicine and she did not develop any more clots. Patient understood. Patient attempted to call me on 10/23/2022, however, I was not near my phone and did not  the call.   I did not call the patient back after missing her phone call. Patient does have a follow-up appointment with [facility] family medicine to establish with a primary care physician. If patient attempts to call again, I will mention that I am not her primary care physician, nor will I be as I strictly work in a hospital setting. I do not do work outpatient. I will tell her that she cannot keep calling me personally to go over her medical care, and that she needs to contact her primary care physician that she establishes with after her appointment at [facility], her cardiologist, or her hematologist if she has questions.       John Roberts,   10/23/2022  7:24 PM

## 2022-10-23 NOTE — ED NOTES
Report given to 3A RN. No further questions at this time. Pt transported to floor by 3A RN, no sign of distress at time of transfer.       Heather Alvarez RN  10/23/22 1941

## 2022-10-24 PROBLEM — R07.89 ATYPICAL CHEST PAIN: Status: ACTIVE | Noted: 2022-10-24

## 2022-10-24 PROBLEM — F41.9 ANXIETY AND DEPRESSION: Status: ACTIVE | Noted: 2022-10-24

## 2022-10-24 PROBLEM — I26.99 PULMONARY EMBOLISM (HCC): Status: ACTIVE | Noted: 2022-10-24

## 2022-10-24 PROBLEM — F32.A ANXIETY AND DEPRESSION: Status: ACTIVE | Noted: 2022-10-24

## 2022-10-24 LAB
ANION GAP SERPL CALCULATED.3IONS-SCNC: 11 MMOL/L (ref 3–16)
BUN BLDV-MCNC: 9 MG/DL (ref 7–20)
CALCIUM SERPL-MCNC: 9.3 MG/DL (ref 8.3–10.6)
CHLORIDE BLD-SCNC: 102 MMOL/L (ref 99–110)
CO2: 22 MMOL/L (ref 21–32)
CREAT SERPL-MCNC: 0.7 MG/DL (ref 0.6–1.1)
EKG ATRIAL RATE: 77 BPM
EKG DIAGNOSIS: NORMAL
EKG P AXIS: 40 DEGREES
EKG P-R INTERVAL: 148 MS
EKG Q-T INTERVAL: 406 MS
EKG QRS DURATION: 88 MS
EKG QTC CALCULATION (BAZETT): 459 MS
EKG R AXIS: 60 DEGREES
EKG T AXIS: 28 DEGREES
EKG VENTRICULAR RATE: 77 BPM
GFR SERPL CREATININE-BSD FRML MDRD: >60 ML/MIN/{1.73_M2}
GLUCOSE BLD-MCNC: 93 MG/DL (ref 70–99)
HCT VFR BLD CALC: 35.6 % (ref 36–48)
HEMOGLOBIN: 11.8 G/DL (ref 12–16)
LV EF: 58 %
LVEF MODALITY: NORMAL
MCH RBC QN AUTO: 32 PG (ref 26–34)
MCHC RBC AUTO-ENTMCNC: 33.1 G/DL (ref 31–36)
MCV RBC AUTO: 96.6 FL (ref 80–100)
PDW BLD-RTO: 13.4 % (ref 12.4–15.4)
PLATELET # BLD: 339 K/UL (ref 135–450)
PMV BLD AUTO: 7.7 FL (ref 5–10.5)
POTASSIUM SERPL-SCNC: 3.8 MMOL/L (ref 3.5–5.1)
RBC # BLD: 3.69 M/UL (ref 4–5.2)
SODIUM BLD-SCNC: 135 MMOL/L (ref 136–145)
WBC # BLD: 8.6 K/UL (ref 4–11)

## 2022-10-24 PROCEDURE — G0378 HOSPITAL OBSERVATION PER HR: HCPCS

## 2022-10-24 PROCEDURE — 2580000003 HC RX 258: Performed by: FAMILY MEDICINE

## 2022-10-24 PROCEDURE — 6370000000 HC RX 637 (ALT 250 FOR IP): Performed by: FAMILY MEDICINE

## 2022-10-24 PROCEDURE — 93325 DOPPLER ECHO COLOR FLOW MAPG: CPT

## 2022-10-24 PROCEDURE — 85027 COMPLETE CBC AUTOMATED: CPT

## 2022-10-24 PROCEDURE — 93308 TTE F-UP OR LMTD: CPT

## 2022-10-24 PROCEDURE — 96374 THER/PROPH/DIAG INJ IV PUSH: CPT

## 2022-10-24 PROCEDURE — 36415 COLL VENOUS BLD VENIPUNCTURE: CPT

## 2022-10-24 PROCEDURE — 6370000000 HC RX 637 (ALT 250 FOR IP): Performed by: INTERNAL MEDICINE

## 2022-10-24 PROCEDURE — 6360000002 HC RX W HCPCS: Performed by: FAMILY MEDICINE

## 2022-10-24 PROCEDURE — 93320 DOPPLER ECHO COMPLETE: CPT

## 2022-10-24 PROCEDURE — 96376 TX/PRO/DX INJ SAME DRUG ADON: CPT

## 2022-10-24 PROCEDURE — 80048 BASIC METABOLIC PNL TOTAL CA: CPT

## 2022-10-24 RX ORDER — LIDOCAINE 4 G/G
1 PATCH TOPICAL DAILY
Status: DISCONTINUED | OUTPATIENT
Start: 2022-10-24 | End: 2022-10-25 | Stop reason: HOSPADM

## 2022-10-24 RX ORDER — HYDROXYZINE HYDROCHLORIDE 25 MG/1
50 TABLET, FILM COATED ORAL EVERY 6 HOURS PRN
Status: DISCONTINUED | OUTPATIENT
Start: 2022-10-24 | End: 2022-10-25 | Stop reason: HOSPADM

## 2022-10-24 RX ORDER — FLUOXETINE HYDROCHLORIDE 20 MG/1
20 CAPSULE ORAL DAILY
Status: DISCONTINUED | OUTPATIENT
Start: 2022-10-24 | End: 2022-10-25 | Stop reason: HOSPADM

## 2022-10-24 RX ADMIN — APIXABAN 5 MG: 5 TABLET, FILM COATED ORAL at 07:59

## 2022-10-24 RX ADMIN — KETOROLAC TROMETHAMINE 15 MG: 15 INJECTION, SOLUTION INTRAMUSCULAR; INTRAVENOUS at 21:44

## 2022-10-24 RX ADMIN — LORAZEPAM 0.5 MG: 0.5 TABLET ORAL at 04:27

## 2022-10-24 RX ADMIN — SODIUM CHLORIDE, PRESERVATIVE FREE 10 ML: 5 INJECTION INTRAVENOUS at 21:45

## 2022-10-24 RX ADMIN — KETOROLAC TROMETHAMINE 15 MG: 15 INJECTION, SOLUTION INTRAMUSCULAR; INTRAVENOUS at 04:22

## 2022-10-24 RX ADMIN — APIXABAN 5 MG: 5 TABLET, FILM COATED ORAL at 21:44

## 2022-10-24 RX ADMIN — FLUOXETINE 20 MG: 20 CAPSULE ORAL at 11:23

## 2022-10-24 RX ADMIN — SODIUM CHLORIDE, PRESERVATIVE FREE 10 ML: 5 INJECTION INTRAVENOUS at 07:58

## 2022-10-24 ASSESSMENT — PAIN DESCRIPTION - LOCATION
LOCATION: BACK
LOCATION: CHEST
LOCATION: CHEST
LOCATION: CHEST;BACK;SHOULDER
LOCATION: CHEST

## 2022-10-24 ASSESSMENT — PAIN SCALES - GENERAL
PAINLEVEL_OUTOF10: 5
PAINLEVEL_OUTOF10: 0
PAINLEVEL_OUTOF10: 3
PAINLEVEL_OUTOF10: 5
PAINLEVEL_OUTOF10: 5
PAINLEVEL_OUTOF10: 0
PAINLEVEL_OUTOF10: 3
PAINLEVEL_OUTOF10: 0
PAINLEVEL_OUTOF10: 0

## 2022-10-24 ASSESSMENT — PAIN DESCRIPTION - ORIENTATION
ORIENTATION: MID
ORIENTATION: RIGHT;MID
ORIENTATION: MID

## 2022-10-24 ASSESSMENT — PAIN DESCRIPTION - DESCRIPTORS
DESCRIPTORS: ACHING
DESCRIPTORS: ACHING;DULL

## 2022-10-24 NOTE — PROGRESS NOTES
Hospital Medicine Progress Note     Date:  10/24/2022    PCP: No primary care provider on file. (Tel: None)    Date of Admission: 10/23/2022    Subjective  Pt feels better, currently chest pain has resolved. Objective  Physical exam:  Vitals: BP (!) 141/71   Pulse 72   Temp 97.5 °F (36.4 °C)   Resp 16   Ht 5' 7\" (1.702 m)   Wt 260 lb 14.4 oz (118.3 kg)   SpO2 96%   BMI 40.86 kg/m²   Gen: Not in distress. Alert. Head: Normocephalic. Atraumatic. Eyes: EOMI. Good acuity. ENT: Oral mucosa moist  Neck: No JVD. No obvious thyromegaly. CVS: Nml S1S2, no MRG, RRR  Pulmomary: Clear bilaterally. No crackles. No wheezes. Gastrointestinal: Soft, NT/ND. Positive bowel sounds. Musculoskeletal: No edema. Warm  Neuro: No focal deficit. Moves extremity spontaneously. Psychiatry: Appropriate affect. Not agitated. Skin: Warm, dry with normal turgor. No rash      24HR INTAKE/OUTPUT:    Intake/Output Summary (Last 24 hours) at 10/24/2022 1443  Last data filed at 10/23/2022 1804  Gross per 24 hour   Intake 240 ml   Output --   Net 240 ml     I/O last 3 completed shifts: In: 240 [P.O.:240]  Out: -   No intake/output data recorded.       Meds:    FLUoxetine  20 mg Oral Daily    lidocaine  1 patch TransDERmal Daily    apixaban  5 mg Oral BID    sodium chloride flush  5-40 mL IntraVENous 2 times per day       Infusions:    sodium chloride           PRN Meds: perflutren lipid microspheres, hydrOXYzine HCl, ketorolac, traMADol, sodium chloride flush, sodium chloride, ondansetron **OR** ondansetron, polyethylene glycol, acetaminophen **OR** acetaminophen    Labs/imaging:  CBC:   Recent Labs     10/23/22  1159 10/24/22  0422   WBC 6.7 8.6   HGB 12.2 11.8*    339         BMP:    Recent Labs     10/23/22  1159 10/24/22  0422    135*   K 4.5 3.8    102   CO2 24 22   BUN 13 9   CREATININE 0.8 0.7   GLUCOSE 92 93         Hepatic:   Recent Labs     10/23/22  1159   AST 25   ALT 28   BILITOT <0.2   ALKPHOS 69       Troponin:   Recent Labs     10/23/22  1159 10/23/22  1711   TROPONINI <0.01 <0.01         BNP: No results for input(s): BNP in the last 72 hours. INR: No results for input(s): INR in the last 72 hours. Reviewed imaging and reports noted      Assessment:  Principal Problem:    Chest pain  Active Problems:    Pulmonary embolism (HCC)    Anxiety and depression    Atypical chest pain  Resolved Problems:    * No resolved hospital problems. *        Plan:  Atypical Chest Pain  - appreciate cardio reccs  - ECHO pending      Possible Left Appendage Thrombus  -cont AC  - echo pending        Pulmonary EMbolism  - cont AC        Anxiety and Depression  - started on prozac and PRN Hydroxyzine  - Psychiatry consulted        Diet: ADULT DIET;  Regular    Activity: up as tolerated  Prophylaxis: eliquis    Code status: Full Code     ----------        Lee Vargas MD  -------------------------------  Rounding hospitalist

## 2022-10-24 NOTE — H&P
HOSPITALISTS HISTORY AND PHYSICAL    10/23/2022 8:13 PM    Patient Information:  Himanshu Amezquita is a 24 y.o. female [id number]  PCP:  No primary care provider on file. (Tel: None )    Chief complaint:    Chief Complaint   Patient presents with    Shoulder Pain     Left since Monday after getting out of ICU for blood clots. Denies sob  denies cough       History of Present Illness:  Anita Flaherty is a 24 y.o. female with h/o DVT/ PE, COVID, Obese, marijuana use on anticoagulation presented with c/o chest pain with radiation to left shoulder. She was admitted to ICU on 10/14/22 with diagnoses of b/l PE with cor pulmonale . She underwent b.l thrombectomy on 10/15/22. She was seen by Hematology as well hypercoagulable work up was started. Eliquis was initiated upon dc . She has been compliant with medications. States she continued to get chest pain since discharge. That worsened yesterday with radiation to left shoulder. She is very anxious and states she feels like she will die. States she has been off of Marijuana since dc from the hospital. Willing to try anxiolytics. She is not on hormones   Chest CT showed   Possible right blood clot involving right atrial appendage   Showed persistent PE with decreased clot burden compared to previous CT. No cardiac strain      REVIEW OF SYSTEMS:   Constitutional: Negative for fever,chills or night sweats  ENT: Negative for rhinorrhea, epistaxis, hoarseness, sore throat. Respiratory: Negative for shortness of breath,wheezing  Cardiovascular: Negative for chest pain, palpitations   Gastrointestinal: Negative for nausea, vomiting, diarrhea  Genitourinary: Negative for polyuria, dysuria   Hematologic/Lymphatic: Negative for bleeding tendency, easy bruising  Musculoskeletal: Negative for myalgias and arthralgias  Neurologic: Negative for confusion,dysarthria.   Skin: Negative for itching,rash  Psychiatric: Negative for depression,anxiety, agitation. Endocrine: Negative for polydipsia,polyuria,heat /cold intolerance. Past Medical History:   has a past medical history of Cardiomyopathy ([address] and History of pulmonary embolus (PE). Past Surgical History:   has no past surgical history on file. Medications:  No current facility-administered medications on file prior to encounter. Current Outpatient Medications on File Prior to Encounter   Medication Sig Dispense Refill    apixaban (ELIQUIS) 5 MG TABS tablet Take 2 tablets by mouth 2 times daily for 5 days 20 tablet 0    apixaban (ELIQUIS) 5 MG TABS tablet Take 1 tablet by mouth 2 times daily 120 tablet 0     Current Facility-Administered Medications   Medication Dose Route Frequency Provider Last Rate Last Admin    apixaban (ELIQUIS) tablet 5 mg  5 mg Oral BID Christos García MD        ketorolac (TORADOL) injection 15 mg  15 mg IntraVENous Q6H PRN Christos García MD        traMADol (ULTRAM) tablet 50 mg  50 mg Oral Q6H PRN Lillie Jackson MD        sodium chloride flush 0.9 % injection 5-40 mL  5-40 mL IntraVENous 2 times per day Christos García MD        sodium chloride flush 0.9 % injection 5-40 mL  5-40 mL IntraVENous PRN Lillie Jackson MD        0.9 % sodium chloride infusion   IntraVENous PRN Christos García MD        ondansetron (ZOFRAN-ODT) disintegrating tablet 4 mg  4 mg Oral Q8H PRN Christos García MD        Or    ondansetron (ZOFRAN) injection 4 mg  4 mg IntraVENous Q6H PRN Christos García MD        polyethylene glycol (GLYCOLAX) packet 17 g  17 g Oral Daily PRN Christos García MD        acetaminophen (TYLENOL) tablet 650 mg  650 mg Oral Q6H PRN Christos García MD        Or    acetaminophen (TYLENOL) suppository 650 mg  650 mg Rectal Q6H PRN Christos García MD        LORazepam (ATIVAN) tablet 0.5 mg  0.5 mg Oral Q4H PRN Christos García MD            Allergies:   Allergies   Allergen Reactions    Bactrim [Sulfamethoxazole-Trimethoprim] Hives        Social History:  Patient Lives    reports that she has never smoked. She has never used smokeless tobacco. She reports current alcohol use. She reports that she does not currently use drugs after having used the following drugs: Marijuana Juradelso Jaimes). Family History:  family history is not on file. ,     Physical Exam:  /87   Pulse 67   Temp 97.4 °F (36.3 °C) (Oral)   Resp 16   Ht 5' 7\" (1.702 m)   Wt 261 lb (118.4 kg)   LMP 09/23/2022 (Approximate)   SpO2 100%   BMI 40.88 kg/m²     General appearance:  Appears comfortable. Well nourished  Eyes: Sclera clear, pupils equal  ENT: Moist mucus membranes, no thrush. Trachea midline. Cardiovascular: Regular rhythm, normal S1, S2. No murmur, gallop, rub. No edema in lower extremities  Respiratory: Clear to auscultation bilaterally, no wheeze, good inspiratory effort  Gastrointestinal: Abdomen soft, non-tender, not distended, normal bowel sounds  Musculoskeletal: No cyanosis in digits, neck supple  Neurology: Cranial nerves grossly intact. Alert and oriented in time, place and person. No speech or motor deficits  Psychiatry: Appropriate affect.  Not agitated  Skin: Warm, dry, normal turgor, no rash  Brisk capillary refill, peripheral pulses palpable   Labs:  CBC:   Lab Results   Component Value Date/Time    WBC 6.7 10/23/2022 11:59 AM    RBC 3.78 10/23/2022 11:59 AM    HGB 12.2 10/23/2022 11:59 AM    HCT 36.3 10/23/2022 11:59 AM    MCV 96.1 10/23/2022 11:59 AM    MCH 32.3 10/23/2022 11:59 AM    MCHC 33.6 10/23/2022 11:59 AM    RDW 13.4 10/23/2022 11:59 AM     10/23/2022 11:59 AM    MPV 7.8 10/23/2022 11:59 AM     BMP:    Lab Results   Component Value Date/Time     10/23/2022 11:59 AM    K 4.5 10/23/2022 11:59 AM    K 3.8 10/14/2022 06:01 PM     10/23/2022 11:59 AM    CO2 24 10/23/2022 11:59 AM    BUN 13 10/23/2022 11:59 AM    CREATININE 0.8 10/23/2022 11:59 AM    CALCIUM 9.2 10/23/2022 11:59 AM GFRAA >60 10/17/2022 04:25 AM    LABGLOM >60 10/23/2022 11:59 AM    GLUCOSE 92 10/23/2022 11:59 AM     CT CHEST PULMONARY EMBOLISM W CONTRAST   Final Result   Addendum (preliminary) 1 of 1   ADDENDUM:   Impression 4:  Potential for thrombus in the left atrial appendage. Consider   echocardiography. Critical results were called by Dr. Fauzia Hoang MD   to Winifred Harada, PA-C, on 10/23/2022 at 14:10. Final   1. Persistent though decreased lobar, segmental, and subsegmental pulmonary   emboli with right upper, right middle, and bilateral lower lobe involvement. Previously seen left upper lobe involvement is no longer identified, and no   definite new involvement is identified. 2. No findings of right heart strain. 3. Mild bronchial wall thickening potentially due to pulmonary vascular   congestion, reactive airways disease, or bronchitis. XR CHEST PORTABLE   Final Result   No evidence of acute cardiopulmonary disease. Chest Xray:   EKG:    I visualized CXR images and EKG strips    Discussed case  with     Problem List  Principal Problem:    Chest pain  Resolved Problems:    * No resolved hospital problems. *        Assessment/Plan:   Chest pain DVT/ PE  Recently diagnosed with PE and DVT involving right upper , right middle and bilateral lower lobe   Repeat CT showed thrombus in left atrial appendage  Repeat Chest CT done today persistent but decreased PE   with no cardiac strain or new burden of embolism involving lungs  Cardiology recommend MISTI tomorrow  NPO past midnight    Anxiety  Started on po Ativan    Obesity . Advised weight loss        DVT prophylaxis   Code status   Diet   IV access   Escalera Catheter    Admit as inpatient. I anticipate hospitalization spanning more than two midnights for investigation and treatment of the above medically necessary diagnoses. Please note that some part of this chart was generated using Dragon dictation software.  Although every effort was made to ensure the accuracy of this automated transcription, some errors in transcription may have occurred inadvertently. If you may need any clarification, please do not hesitate to contact me through [facility].        Giovanna Da Silva MD    10/23/2022 8:13 PM

## 2022-10-24 NOTE — PROGRESS NOTES
Patient very anxious. Grandmother and mother at bedside. Underwent thrombectomy last week of bi lung clots. Dc'd home on Eliquis. Returned to ER 10/23/22 due to CP's. Discontinued IV SL in R AC, due to painful when flushing and at patient's request. Attempt x2 with #22 for L hand SL. Will attempt another site. Occasional c/o mid sternal CP's, states comes and goes. MISTI 10/24/22. No dc plans at this time.

## 2022-10-24 NOTE — PLAN OF CARE
Problem: Discharge Planning  Goal: Discharge to home or other facility with appropriate resources  10/24/2022 1707 by Afsaneh Hayes RN  Outcome: Progressing  10/24/2022 0337 by Mesfin Irby RN  Outcome: Progressing  Flowsheets (Taken 10/23/2022 2028)  Discharge to home or other facility with appropriate resources: Identify barriers to discharge with patient and caregiver     Problem: Pain  Goal: Verbalizes/displays adequate comfort level or baseline comfort level  10/24/2022 1707 by Afsaneh Hayes RN  Outcome: Progressing  10/24/2022 0830 by Pauline Leon RN  Outcome: Progressing  Flowsheets (Taken 10/24/2022 0415 by Mesfin Irby RN)  Verbalizes/displays adequate comfort level or baseline comfort level: Encourage patient to monitor pain and request assistance  Note: Pt c/o mild chest pain that radiates to shoulder and back, declines pain medication, SB on tele hr 50's, lungs diminished in bases, on RA, npo for possible MISTI.  10/24/2022 0337 by Mesfin Irby RN  Outcome: Progressing  Flowsheets  Taken 10/24/2022 0028 by Mesfin Irby RN  Verbalizes/displays adequate comfort level or baseline comfort level: Encourage patient to monitor pain and request assistance  Taken 10/23/2022 2028 by Mesfin Irby RN  Verbalizes/displays adequate comfort level or baseline comfort level: Encourage patient to monitor pain and request assistance  Taken 10/23/2022 1645 by Shahnaz Isbell RN  Verbalizes/displays adequate comfort level or baseline comfort level: Encourage patient to monitor pain and request assistance     Problem: ABCDS Injury Assessment  Goal: Absence of physical injury  10/24/2022 1707 by Afsaneh Hayes RN  Outcome: Progressing  10/24/2022 0337 by Mesfin Irby RN  Outcome: Progressing     Problem: Safety - Adult  Goal: Free from fall injury  10/24/2022 1707 by Afsaneh Hayes RN  Outcome: Progressing  10/24/2022 0337 by Mesfin Irby RN  Outcome: Progressing

## 2022-10-24 NOTE — CONSULTS
[address] [facility]  [phone]      Chief Complaint   Patient presents with    Shoulder Pain     Left since Monday after getting out of ICU for blood clots. Denies sob  denies cough        History of Present Illness:  Sami Bowers is a 24 y.o. patient who presented to the hospital with complaints of chest pain. I have been asked to provide consultation regarding further management and testing. The patient reports that she has had \"sharp pains\" in the chest since childhood. She was seen by children's cardiology and not thought to have cardiomyopathy. She wore a holter monitor, which reportedly did not show an arrhythmia per her report. She has been active walking miles with her friend 3-4 times per week. She present The patient reports that she has had chest pain for the past week. She reports that the discomfort is constant. She reports that it is located in the center of her chest and radiates to the back and left shoulder. She states that it initially improved with laying supine, but is no longer doing so. She reports that the discomfort is not associated with emotion or exertion. She states that the discomfort worsened with breathing and improved with toradol. Mom is at bedside. She reports that she feels \"anxious all the time\" over the last year and was drinking whiteclaws to help cope. She states that she recently had trauma in her life and is interested in further treatment. She reports that she snores and \"feels smothered\" if she lays down flat for years. Past Medical History:   has a past medical history of Cardiomyopathy ([address] and History of pulmonary embolus (PE). Surgical History:   has no past surgical history on file. Social History:   reports that she has never smoked. She has never used smokeless tobacco. She reports current alcohol use. She reports that she does not currently use drugs after having used the following drugs: Marijuana Daril Landen).      Family History:  She reports that her father \"might have heart problems\" and grandpa on her mom's side \"had a bad heart\"    Home Medications:  Were reviewed and are listed in nursing record. and/or listed below  Prior to Admission medications    Medication Sig Start Date End Date Taking?  Authorizing Provider   apixaban (ELIQUIS) 5 MG TABS tablet Take 2 tablets by mouth 2 times daily for 5 days 10/17/22 10/22/22  Janice Rubin, DO   apixaban (ELIQUIS) 5 MG TABS tablet Take 1 tablet by mouth 2 times daily 10/23/22   Janice Rubin, DO        Current Medications:  Current Facility-Administered Medications   Medication Dose Route Frequency Provider Last Rate Last Admin    perflutren lipid microspheres (DEFINITY) injection 1.65 mg  1.5 mL IntraVENous ONCE PRN Qing Browne DO        FLUoxetine (PROZAC) capsule 20 mg  20 mg Oral Daily Lee Vargas MD        hydrOXYzine HCl (ATARAX) tablet 50 mg  50 mg Oral Q6H PRN Lee Vargas MD        apixaban (ELIQUIS) tablet 5 mg  5 mg Oral BID Shabana Seth MD   5 mg at 10/24/22 0759    ketorolac (TORADOL) injection 15 mg  15 mg IntraVENous Q6H PRN Shabana Seth MD   15 mg at 10/24/22 0422    traMADol (ULTRAM) tablet 50 mg  50 mg Oral Q6H PRN Shabana Seth MD        sodium chloride flush 0.9 % injection 5-40 mL  5-40 mL IntraVENous 2 times per day Shabana Seth MD   10 mL at 10/24/22 0758    sodium chloride flush 0.9 % injection 5-40 mL  5-40 mL IntraVENous PRN Shabana Seth MD        0.9 % sodium chloride infusion   IntraVENous PRN Shabana Seth MD        ondansetron (ZOFRAN-ODT) disintegrating tablet 4 mg  4 mg Oral Q8H PRN Shabana Seth MD        Or    ondansetron (ZOFRAN) injection 4 mg  4 mg IntraVENous Q6H PRN Shabana Seth MD        polyethylene glycol (GLYCOLAX) packet 17 g  17 g Oral Daily PRN Shabana Seth MD        acetaminophen (TYLENOL) tablet 650 mg  650 mg Oral Q6H PRN Shabana Seth MD        Or    acetaminophen (TYLENOL) suppository 650 mg  650 mg Rectal Q6H PRN Lillie Rubio Calderon MD            Allergies:  Bactrim [sulfamethoxazole-trimethoprim]     Review of Systems:     Constitutional: there has been no unanticipated weight loss. +anxiety  Eyes: No visual changes or diplopia. No scleral icterus. ENT: No Headaches, hearing loss or vertigo. No mouth sores or sore throat. Cardiovascular: +chest pain  Respiratory: No cough or wheezing, no sputum production. No hematemesis. Gastrointestinal: No abdominal pain, appetite loss, blood in stools. No change in bowel or bladder habits. Genitourinary: No dysuria, trouble voiding, or hematuria. Musculoskeletal:  No gait disturbance, weakness or joint complaints. Integumentary: No rash or pruritis. Neurological: No headache, diplopia, change in muscle strength, numbness or tingling. No change in gait, balance, coordination, mood, affect, memory, mentation, behavior. Psychiatric: No anxiety, no depression. Endocrine: No malaise, fatigue or temperature intolerance. No excessive thirst, fluid intake, or urination. No tremor. Hematologic/Lymphatic: No abnormal bruising or bleeding, blood clots or swollen lymph nodes. Allergic/Immunologic: No nasal congestion or hives.       Physical Examination:    Vitals:    10/24/22 1115   BP: (!) 141/71   Pulse: 72   Resp: 16   Temp: 97.5 °F (36.4 °C)   SpO2: 96%    Weight: 260 lb 14.4 oz (118.3 kg)         General Appearance:  Alert, cooperative, no distress, appears stated age   Head:  Normocephalic, without obvious abnormality, atraumatic   Eyes:  PERRL, conjunctiva/corneas clear       Nose: Nares normal, no drainage or sinus tenderness   Throat: Lips, mucosa, and tongue normal   Neck: Supple, symmetrical, trachea midline, no adenopathy, thyroid: not enlarged, symmetric, no tenderness/mass/nodules, no carotid bruit or JVD       Lungs:   Clear to auscultation bilaterally, respirations unlabored   Chest Wall:  No tenderness or deformity   Heart:  Regular rate and rhythm, S1, S2 normal, no murmur, rub or gallop   Abdomen:   Soft, non-tender, bowel sounds active all four quadrants,  no masses, no organomegaly           Extremities: Extremities normal, atraumatic, no cyanosis or edema   Pulses: 2+ and symmetric   Skin: Skin color, texture, turgor normal, no rashes or lesions   Pysch: Normal mood and affect   Neurologic: Normal gross motor and sensory exam.         Labs  CBC:   Lab Results   Component Value Date/Time    WBC 8.6 10/24/2022 04:22 AM    RBC 3.69 10/24/2022 04:22 AM    HGB 11.8 10/24/2022 04:22 AM    HCT 35.6 10/24/2022 04:22 AM    MCV 96.6 10/24/2022 04:22 AM    RDW 13.4 10/24/2022 04:22 AM     10/24/2022 04:22 AM     CMP:    Lab Results   Component Value Date/Time     10/24/2022 04:22 AM    K 3.8 10/24/2022 04:22 AM    K 3.8 10/14/2022 06:01 PM     10/24/2022 04:22 AM    CO2 22 10/24/2022 04:22 AM    BUN 9 10/24/2022 04:22 AM    CREATININE 0.7 10/24/2022 04:22 AM    GFRAA >60 10/17/2022 04:25 AM    AGRATIO 1.4 10/23/2022 11:59 AM    LABGLOM >60 10/24/2022 04:22 AM    GLUCOSE 93 10/24/2022 04:22 AM    PROT 7.2 10/23/2022 11:59 AM    CALCIUM 9.3 10/24/2022 04:22 AM    BILITOT <0.2 10/23/2022 11:59 AM    ALKPHOS 69 10/23/2022 11:59 AM    AST 25 10/23/2022 11:59 AM    ALT 28 10/23/2022 11:59 AM     PT/INR:  No results found for: PTINR  Lab Results   Component Value Date    TROPONINI <0.01 10/23/2022       EKG:  I have reviewed EKG with the following interpretation:  Impression:  sinus rhythm, t wave inversion inferiorly     Echo:  Normal LV systolic function with an estimated ejection fraction of 60%. D-shape septum due to right ventricular pressure and volume overload. Indeterminate diastolic function. Mild concentric LV hypertrophy. The right ventricle appears severely dilated and hypokinetic. There is severe hypokinesis of the RV free wall with the apex appearing   hyperdynamic (Medrano's sign) c/w pulmonary embolus.    Mild tricuspid regurgitation with an estimated PASP of 23 mmHg. Mild pulmonic regurgitation present. Small anterior pericardial effusion noted. Stress: none  Cath: none  MRI/EP/Other: none    Old notes reviewed  Telemetry reviewd  Ekg personally reviewed  Chest xray personally reviewed  Echo, cath, and   Medications and labs reviewed  high complexity/medical decision making due to extensive data review, extensive history review, independent review of data  high risk due to acute illness, evaluation of drug-drug interactions, medication management and diagnostic interventions      Assessment  Patient Active Problem List   Diagnosis    Acute pulmonary embolism with acute cor pulmonale (HCC)    Right ventricular systolic dysfunction without heart failure    Chest pain         Plan:    I had the opportunity to review the clinical symptoms and presentation of [employer]. Assessment/Plan:  Atypical chest pain  - constant, sharp, positional  - secondary to pulmonary embolism  - new problem  - troponin negative  Plan:  - continue anticoagulation  - pain control  - recommend 30 day monitor at discharge    2. Pulmonary embolism with right ventricular dysfunction  - new problem  Plan:  - echocardiogram  - evaluation for thrombophilia per hematology. If she has lupus anticoagulant, warfarin would be a better choice  - patient needs 6 minute walk test before discharge  - ? CTEPH  - patient needs sleep study as outpatient   - check emily    3. Concern for possible left atrial appendage thrombus  - appears to be coumadin ridge on review  - new problem  Plan:  - no indication for MISTI at this time. It does not appear to be a thrombus on review. Discussed with patient and mom. There is a significant risk of sedation in the setting of pulmonary embolism and right ventricular dysfunction. Furthermore, a thrombus in the atrial appendage will not  as anticoagulation is the treatment of choice    4.  Anxiety   - new problem  - recent stressors and trauma  - alcohol consumption  Plan:  - patient requests mental health input  - consult psychiatry    5. Family history of reported cardiomyopathy  - new problem  - patient seen by childrens and not thought to have cardiomyopathy  Plan:  - recommend outpatient cardiac MRI    6.  Anemia    Rian Mortensen, [id number] 11:20 AM

## 2022-10-24 NOTE — CARE COORDINATION
Discharge Planning  Patient from home under  Observation with an anticipated short hospitalization length of stay. Chart reviewed and it appears that patient has minimal needs for discharge at this time. Patient screened by Financial Counselor, may qualify for 100% FAF. She will Meds to Bed  and PCP  follow up with [facility] on discharge. Discussed with patients nurse and requested that case management be notified if discharge needs are identified. Case management will continue to follow progress and update discharge plan as needed.

## 2022-10-24 NOTE — PROGRESS NOTES
Consult received for depression and anxiety. Came to assess patient but she was in ECHO. Will plan to assess tomorrow.      Karen Goncalves MD  Psychiatry

## 2022-10-24 NOTE — PLAN OF CARE
Problem: Pain  Goal: Verbalizes/displays adequate comfort level or baseline comfort level  10/24/2022 0830 by Riana Flaherty, RN  Outcome: Progressing  Flowsheets (Taken 10/24/2022 0415 by Betsy Bautista RN)  Verbalizes/displays adequate comfort level or baseline comfort level: Encourage patient to monitor pain and request assistance  Note: Pt c/o mild chest pain that radiates to shoulder and back, declines pain medication, SB on tele hr 50's, lungs diminished in bases, on RA, npo for possible MISIT.

## 2022-10-25 VITALS
HEART RATE: 65 BPM | OXYGEN SATURATION: 99 % | WEIGHT: 261.1 LBS | BODY MASS INDEX: 40.98 KG/M2 | SYSTOLIC BLOOD PRESSURE: 151 MMHG | DIASTOLIC BLOOD PRESSURE: 83 MMHG | RESPIRATION RATE: 16 BRPM | TEMPERATURE: 97.7 F | HEIGHT: 67 IN

## 2022-10-25 DIAGNOSIS — R07.9 CHEST PAIN, UNSPECIFIED TYPE: Primary | ICD-10-CM

## 2022-10-25 LAB
ANION GAP SERPL CALCULATED.3IONS-SCNC: 8 MMOL/L (ref 3–16)
BUN BLDV-MCNC: 11 MG/DL (ref 7–20)
CALCIUM SERPL-MCNC: 9.2 MG/DL (ref 8.3–10.6)
CHLORIDE BLD-SCNC: 104 MMOL/L (ref 99–110)
CO2: 26 MMOL/L (ref 21–32)
CREAT SERPL-MCNC: 0.9 MG/DL (ref 0.6–1.1)
GFR SERPL CREATININE-BSD FRML MDRD: >60 ML/MIN/{1.73_M2}
GLUCOSE BLD-MCNC: 90 MG/DL (ref 70–99)
HCT VFR BLD CALC: 35.5 % (ref 36–48)
HEMOGLOBIN: 11.8 G/DL (ref 12–16)
MCH RBC QN AUTO: 32 PG (ref 26–34)
MCHC RBC AUTO-ENTMCNC: 33.4 G/DL (ref 31–36)
MCV RBC AUTO: 96 FL (ref 80–100)
PDW BLD-RTO: 13.3 % (ref 12.4–15.4)
PLATELET # BLD: 329 K/UL (ref 135–450)
PMV BLD AUTO: 7.5 FL (ref 5–10.5)
POTASSIUM SERPL-SCNC: 4.1 MMOL/L (ref 3.5–5.1)
RBC # BLD: 3.69 M/UL (ref 4–5.2)
SODIUM BLD-SCNC: 138 MMOL/L (ref 136–145)
TROPONIN: <0.01 NG/ML
WBC # BLD: 6.3 K/UL (ref 4–11)

## 2022-10-25 PROCEDURE — 6370000000 HC RX 637 (ALT 250 FOR IP): Performed by: INTERNAL MEDICINE

## 2022-10-25 PROCEDURE — 94680 O2 UPTK RST&XERS DIR SIMPLE: CPT

## 2022-10-25 PROCEDURE — 36415 COLL VENOUS BLD VENIPUNCTURE: CPT

## 2022-10-25 PROCEDURE — 86038 ANTINUCLEAR ANTIBODIES: CPT

## 2022-10-25 PROCEDURE — 84484 ASSAY OF TROPONIN QUANT: CPT

## 2022-10-25 PROCEDURE — 85027 COMPLETE CBC AUTOMATED: CPT

## 2022-10-25 PROCEDURE — 2580000003 HC RX 258: Performed by: FAMILY MEDICINE

## 2022-10-25 PROCEDURE — 93242 EXT ECG>48HR<7D RECORDING: CPT | Performed by: INTERNAL MEDICINE

## 2022-10-25 PROCEDURE — 94760 N-INVAS EAR/PLS OXIMETRY 1: CPT

## 2022-10-25 PROCEDURE — 6370000000 HC RX 637 (ALT 250 FOR IP): Performed by: FAMILY MEDICINE

## 2022-10-25 PROCEDURE — 80048 BASIC METABOLIC PNL TOTAL CA: CPT

## 2022-10-25 PROCEDURE — G0378 HOSPITAL OBSERVATION PER HR: HCPCS

## 2022-10-25 RX ORDER — HYDROXYZINE 50 MG/1
50 TABLET, FILM COATED ORAL EVERY 6 HOURS PRN
Qty: 40 TABLET | Refills: 0 | Status: SHIPPED | OUTPATIENT
Start: 2022-10-25 | End: 2022-11-04

## 2022-10-25 RX ORDER — FLUOXETINE HYDROCHLORIDE 20 MG/1
20 CAPSULE ORAL DAILY
Qty: 30 CAPSULE | Refills: 3 | Status: SHIPPED | OUTPATIENT
Start: 2022-10-26

## 2022-10-25 RX ORDER — KETOROLAC TROMETHAMINE 15 MG/ML
15 INJECTION, SOLUTION INTRAMUSCULAR; INTRAVENOUS ONCE
Status: DISCONTINUED | OUTPATIENT
Start: 2022-10-25 | End: 2022-10-25 | Stop reason: HOSPADM

## 2022-10-25 RX ADMIN — APIXABAN 5 MG: 5 TABLET, FILM COATED ORAL at 09:42

## 2022-10-25 RX ADMIN — FLUOXETINE 20 MG: 20 CAPSULE ORAL at 09:43

## 2022-10-25 RX ADMIN — SODIUM CHLORIDE, PRESERVATIVE FREE 10 ML: 5 INJECTION INTRAVENOUS at 09:43

## 2022-10-25 ASSESSMENT — PAIN SCALES - GENERAL
PAINLEVEL_OUTOF10: 0

## 2022-10-25 NOTE — PROGRESS NOTES
CLINICAL PHARMACY NOTE: MEDS TO BEDS    Total # of Prescriptions Filled: 2   The following medications were delivered to the patient:  Fluoxetine 20 mg  Hydroxyzine 50 mg    Additional Documentation:  Delivered to Patient=Signed  Ok to be delivered per Katie Krzysztof Mills CPhT

## 2022-10-25 NOTE — PROGRESS NOTES
Patient had ECHO 10/24/22 with EF 55-60%. Lidocaine patch placed to mid back region. States CP has improved but seems like it's now more in her back region. SR-SB on monitor. Possible dc 10/25/22.

## 2022-10-25 NOTE — DISCHARGE SUMMARY
Juliana Doll RCP   Respiratory Therapist   Specialty:  Respiratory Therapy   Progress Notes      Signed   Date of Service:  10/25/2022 11:28 AM                 Signed                                 10/25/22 1127   Resting (Room Air)   SpO2 99   HR 86   During Walk (Room Air)   SpO2 92      Walk/Assistance Device Ambulation   Rate of Dyspnea 0   After Walk   Does the Patient Qualify for Home O2 No

## 2022-10-25 NOTE — CONSULTS
PSYCHIATRY CONSULT, INITIAL EVALUATION    Attending Provider:  Tatyana Bray MD    CC/Reason for Consult: anxiety, depression    HPI:   context: 25 yo F who presented with chest pain. Concern for anxiety contributing so psychiatry was consulted. associated symptoms:   Feels frequently nervous thinks about multiple things, worries about her health, her future. Thinks about the loss of her boyfriend often. Feels she has struggled with depression for a long time, avoided talking about it, coped with it by drinking heavily daily. Used to get suicidal ideation, but this stopped after her recent health issues which made her realize that she wants to live. Has a hard time sleeping.     modifying factors:   Boyfriend was shot and killed by police on 16/4 after he shot someone else. She has been drinking heavily since 2021 over 6-8 drinks per day to cope with her mood/anxiety. She was in the ICU shortly after boyfriend's death with pulmonary emboli. Timing: acute on chronic  duration: several yr  severity: moderate to severe    ROS:   Gen: no fevers or chills, HEENT: no vision or hearing problems, no HA CV: occ CP, no palpitation RESP: no dyspnea : no dysuria MSK: no muscle or joint pain GI: no n/v/d, no abd pain  SKIN: no rashes NEURO:  no weakness, no numbness ENDO: no weight changes, no tremors    Past Psychiatric History:   Hosp: denies  Diagnoses: denies  Med trials: denies  Outpt: denies  Suicide Attempts: denies    Substance Use History:  Alcohol: 6-8 drinks or more per day for about 1.5 yrs  Illicits: denies    Past Medical History:   Diagnosis Date    [address]iomyopathy [address] [address]     \"had that since I was born\"    History of pulmonary embolus (PE)      Social/Developmental History:   Relationship: single  Children: none  Supports: mother, brother, grandmother  Housing: lives alone  Occ/Inc: off work currently, works as a bank service rep    History reviewed. No pertinent family history.   Psychiatric: depression and anxiety in several members of mother's side (mother, SLICK BLAKE)    Allergies   Allergen Reactions    Bactrim [Sulfamethoxazole-Trimethoprim] Hives       Medications:  Scheduled Meds:   ketorolac  15 mg IntraVENous Once    FLUoxetine  20 mg Oral Daily    lidocaine  1 patch TransDERmal Daily    apixaban  5 mg Oral BID    sodium chloride flush  5-40 mL IntraVENous 2 times per day       OBJECTIVE:  Weight: 261 lb 1.6 oz (118.4 kg), BP: (!) 151/83, Pain 0-10: Pain Level: 0;     MSE:   Appearance    alert, cooperative  Motor: No abnormal movements, tics or mannerisms. Speech    spontaneous, normal rate, and normal volume  Language    0 - no aphasia, normal  Mood/Affect    Anxious and Depressed / normal affect  Thought Process    linear, goal directed, and coherent  Thought Content    intact , no SI/no HI  Associations    logical connections  Attention/Concentration    intact  Orientation    oriented to person, place, time, and general circumstances  Memory    recent and remote memory intact  Fund of Knowledge    intact  Insight/Judgement    Good / Intact    Labs:     Lab Results   Component Value Date    CREATININE 0.9 10/25/2022    BUN 11 10/25/2022     10/25/2022    K 4.1 10/25/2022     10/25/2022    CO2 26 10/25/2022     Lab Results   Component Value Date    WBC 6.3 10/25/2022    HGB 11.8 (L) 10/25/2022    HCT 35.5 (L) 10/25/2022    MCV 96.0 10/25/2022     10/25/2022         Imaging: no head imaging on file      ASSESSMENT:   25 yo F who has dealt with long standing depression and anxiety, alcohol abuse for about 1.5 yrs prior to recent loss of boyfriend. Then coping with hospitalization for PEs which motivated her to stay sober. Anxiety may be contributing to some her chest pains. Unspecified depressive disorder  Unspecified anxiety disorder    RECOMMENDATIONS:   1. Agree with fluoxetine 20mg daily, atarax prn. She can try using the atarax for sleep.    2. Recommend she establish with a therapist in her network. I gave a couple referral options  3. Avoid alcohol    Dispo: does not require inpatient psychiatric treatment    Thank you for this consult, please call the psychiatry consult line for further questions. I will sign off at this time.              Luana Brown MD   Psychiatrist [[email or web address]] : notes\par no dvt 12/15/17\par arthrosis of knee, genu varus deformity no osteo\par h/o aortic dissection

## 2022-10-25 NOTE — DISCHARGE SUMMARY
FINAL PROGRESS NOTE/DISCHARGE SUMMARY    The patient was 24 y.o. female who was admitted for chest pain. Patient was recently discharged with PE. During the hospitalization patient was elevated by cardiology and did not recommend MISTI recommend continue Eliquis and echo was done which did not show any thrombus. Due to anxiety she was started on Prozac and Atarax and psych was consulted. The patient was seen and examined on the day of the discharge. Will be discharged to home, diet regular and activity as tolerated. Condition: Stable    MEDICATIONS:  Current Discharge Medication List        START taking these medications    Details   hydrOXYzine HCl (ATARAX) 50 MG tablet Take 1 tablet by mouth every 6 hours as needed for Anxiety  Qty: 40 tablet, Refills: 0      FLUoxetine (PROZAC) 20 MG capsule Take 1 capsule by mouth daily  Qty: 30 capsule, Refills: 3           CONTINUE these medications which have NOT CHANGED    Details   apixaban (ELIQUIS) 5 MG TABS tablet Take 1 tablet by mouth 2 times daily  Qty: 120 tablet, Refills: 0             LAST SET OF VITALS:    BP: (!) 151/83  Temp: 97.7 °F (36.5 °C)  Heart Rate: 65  Resp: 16  Height: 5' 7\" (170.2 cm)  @PHPLASTWT(1)@    The patient is to follow their PCP as scheduled.     [facility] Emergency Department  [address] [place] [address]  [phone]    If symptoms worsen      Electronically signed by Yessenia Rojas MD, MD on 10/25/2022 at 11:26 AM

## 2022-10-25 NOTE — PLAN OF CARE
Problem: Pain  Goal: Verbalizes/displays adequate comfort level or baseline comfort level  10/25/2022 0958 by Jennifer Moreno RN  Outcome: Progressing  Flowsheets  Taken 10/25/2022 0430 by Consuelo Puckett RN  Verbalizes/displays adequate comfort level or baseline comfort level: Encourage patient to monitor pain and request assistance  Taken 10/25/2022 0000 by Consuelo Puckett RN  Verbalizes/displays adequate comfort level or baseline comfort level: Encourage patient to monitor pain and request assistance  Note: Pt A&Ox4, SB on tele hr 50-60's, c/o very mild chest and back pain, declined toradol, BP (!) 151/83   Pulse 65   Temp 97.7 °F (36.5 °C)   Resp 16   Ht 5' 7\" (1.702 m)   Wt 261 lb 1.6 oz (118.4 kg)   SpO2 96%   BMI 40.89 kg/m²   Awaiting cards and psych to see, pt much less anxious today, will continue to assess and report changes.

## 2022-10-25 NOTE — PROGRESS NOTES
10/25/22 1127   Resting (Room Air)   SpO2 99   HR 86   During Walk (Room Air)   SpO2 92      Walk/Assistance Device Ambulation   Rate of Dyspnea 0   After Walk   Does the Patient Qualify for Home O2 No

## 2022-10-25 NOTE — CARE COORDINATION
Discharge Plan:   Patient discharged   on 10/25/22 to home with family. PCP follow up ;   [facility] [address]   Phone: [phone]   APPT NOV 1ST 2:15 Michoacano Govea MD.    All discharge needs met per case management

## 2022-10-25 NOTE — DISCHARGE INSTR - COC
Continuity of Care Form    Patient Name: Shahriar Goldman   :  1995  MRN:  [id number]    Admit date:  10/23/2022  Discharge date:  ***    Code Status Order: Full Code   Advance Directives:     Admitting Physician:  Cristino French MD  PCP: No primary care provider on file. Discharging Nurse: [facility] Unit/Room#: 3AN-3317/3317-01  Discharging Unit Phone Number: ***    Emergency Contact:   Extended Emergency Contact Information  Primary Emergency Contact: [address] Phone: [phone]  Relation: Parent    Past Surgical History:  History reviewed. No pertinent surgical history. Immunization History: There is no immunization history on file for this patient. Active Problems:  Patient Active Problem List   Diagnosis Code    Acute pulmonary embolism with acute cor pulmonale (HCC) I26.09    Right ventricular systolic dysfunction without heart failure I51.89    Chest pain R07.9    Pulmonary embolism (HCC) I26.99    Anxiety and depression F41.9, F32. A    Atypical chest pain R07.89       Isolation/Infection:   Isolation            No Isolation          Patient Infection Status       None to display            Nurse Assessment:  Last Vital Signs: BP (!) 151/83   Pulse 65   Temp 97.7 °F (36.5 °C)   Resp 16   Ht 5' 7\" (1.702 m)   Wt 261 lb 1.6 oz (118.4 kg)   SpO2 99%   BMI 40.89 kg/m²     Last documented pain score (0-10 scale): Pain Level: 0  Last Weight:   Wt Readings from Last 1 Encounters:   10/25/22 261 lb 1.6 oz (118.4 kg)     Mental Status:  {IP PT MENTAL STATUS:}    IV Access:  { PRIYA IV ACCESS:378848515}    Nursing Mobility/ADLs:  Walking   {P DME XHNE:522854724}  Transfer  {CHP DME URMH:799710812}  Bathing  {CHP DME IDBE:758113735}  Dressing  {CHP DME SYIB:631061422}  Toileting  {CHP DME WCVH:692459000}  Feeding  {P DME BDUU:192601035}  Med Admin  {P DME RGKL:167706233}  Med Delivery   { PRIYA MED Delivery:978465845}    Wound Care Documentation and Therapy:        Elimination:  Continence: Bowel: {YES / ZH:65253}  Bladder: {YES / DH:86602}  Urinary Catheter: {Urinary Catheter:476585438}   Colostomy/Ileostomy/Ileal Conduit: {YES / YN:20615}       Date of Last BM: ***  No intake or output data in the 24 hours ending 10/25/22 1458  No intake/output data recorded.     Safety Concerns:     [address] [employer] Safety Concerns:400760421}    Impairments/Disabilities:      [address] [employer] Impairments/Disabilities:186573023}    Nutrition Therapy:  Current Nutrition Therapy:   [address] [employer] Diet List:184563557}    Routes of Feeding: {CHP DME Other Feedings:896436992}  Liquids: {Slp liquid thickness:59482}  Daily Fluid Restriction: {CHP DME Yes amt example:725121603}  Last Modified Barium Swallow with Video (Video Swallowing Test): {Done Not Done CQLE:023678009}    Treatments at the Time of Hospital Discharge:   Respiratory Treatments: ***  Oxygen Therapy:  {Therapy; copd oxygen:64619}  Ventilator:    { CC Vent VKUW:966986033}    Rehab Therapies: {THERAPEUTIC INTERVENTION:1588772390}  Weight Bearing Status/Restrictions: [facility] Weight Bearin}  Other Medical Equipment (for information only, NOT a DME order):  {EQUIPMENT:912707223}  Other Treatments: ***    Patient's personal belongings (please select all that are sent with patient):  {CHP DME Belongings:659396788}    RN SIGNATURE:  {Esignature:711961568}    CASE MANAGEMENT/SOCIAL WORK SECTION    Inpatient Status Date: ***    Readmission Risk Assessment Score:  Readmission Risk              Risk of Unplanned Readmission:  0           Discharging to Facility/ Agency  PCP  Follow up with   The [address] [place] [address] Phone: [phone]   APPT  2:15 Ann Landrum MD        Phone:  Fax:    Dialysis Facility (if applicable)   Name:  Address:  Dialysis Schedule:  Phone:  Fax:    / signature: Electronically signed by Lemuel Wu RN on 10/25/22 at 3:00 PM EDT    PHYSICIAN SECTION    Prognosis:     Condition at Discharge: [address] Marita Marc Patient Condition:336250684}    Rehab Potential (if transferring to Rehab): {Prognosis:0550488115}    Recommended Labs or Other Treatments After Discharge: ***    Physician Certification: I certify the above information and transfer of Rochelle Trujillo  is necessary for the continuing treatment of the diagnosis listed and that she requires {Admit to Appropriate Level of Care:15944} for {GREATER/LESS:897554221} 30 days.      Update Admission H&P: {CHP DME Changes in GRLSU:417815150}    PHYSICIAN SIGNATURE:  {Esignature:881596048}

## 2022-10-25 NOTE — PROGRESS NOTES
[facility] [address]  Progress notes  [phone]      Chief Complaint   Patient presents with    Shoulder Pain     Left since Monday after getting out of ICU for blood clots. Denies sob  denies cough        History of Present Illness:  Farhana Godoy is a 24 y.o. patient who presented to the hospital with complaints of chest pain. I have been asked to provide consultation regarding further management and testing. The patient reports that she has had \"sharp pains\" in the chest since childhood. She was seen by children's cardiology and not thought to have cardiomyopathy. She wore a holter monitor, which reportedly did not show an arrhythmia per her report. She has been active walking miles with her friend 3-4 times per week. She present The patient reports that she has had chest pain for the past week. She reports that the discomfort is constant. She reports that it is located in the center of her chest and radiates to the back and left shoulder. She states that it initially improved with laying supine, but is no longer doing so. She reports that the discomfort is not associated with emotion or exertion. She states that the discomfort worsened with breathing and improved with toradol. Mom is at bedside. She reports that she feels \"anxious all the time\" over the last year and was drinking whiteclaws to help cope. She states that she recently had trauma in her life and is interested in further treatment. She reports that she snores and \"feels smothered\" if she lays down flat for years. Subjective:   No acute events overnight. Patient feels better today. No nausea or vomiting. Has not yet been walking in the halls. No fever or chills    Past Medical History:   has a past medical history of Cardiomyopathy ([address] and History of pulmonary embolus (PE). Surgical History:   has no past surgical history on file. Social History:   reports that she has never smoked.  She has never used smokeless tobacco. She reports current alcohol use. She reports that she does not currently use drugs after having used the following drugs: Marijuana Yani Trinh). Family History:  She reports that her father \"might have heart problems\" and grandpa on her mom's side \"had a bad heart\"    Home Medications:  Were reviewed and are listed in nursing record. and/or listed below  Prior to Admission medications    Medication Sig Start Date End Date Taking?  Authorizing Provider   apixaban (ELIQUIS) 5 MG TABS tablet Take 2 tablets by mouth 2 times daily for 5 days 10/17/22 10/22/22  Jericho Womack DO   apixaban (ELIQUIS) 5 MG TABS tablet Take 1 tablet by mouth 2 times daily 10/23/22   Jericho Womack DO        Current Medications:  Current Facility-Administered Medications   Medication Dose Route Frequency Provider Last Rate Last Admin    ketorolac (TORADOL) injection 15 mg  15 mg IntraVENous Once Ygfloridalma Gary DO        perflutren lipid microspheres (DEFINITY) injection 1.65 mg  1.5 mL IntraVENous ONCE PRN Yg Gary DO        FLUoxetine (PROZAC) capsule 20 mg  20 mg Oral Daily Monroe Hoover MD   20 mg at 10/24/22 1123    hydrOXYzine HCl (ATARAX) tablet 50 mg  50 mg Oral Q6H PRN Monroe Hoover MD        lidocaine 4 % external patch 1 patch  1 patch TransDERmal Daily Yg Gary DO   1 patch at 10/24/22 2157    apixaban (ELIQUIS) tablet 5 mg  5 mg Oral BID Kellie Lazar MD   5 mg at 10/24/22 2144    ketorolac (TORADOL) injection 15 mg  15 mg IntraVENous Q6H PRN Kellie Lazar MD   15 mg at 10/24/22 2144    traMADol (ULTRAM) tablet 50 mg  50 mg Oral Q6H PRN Kellie Lazar MD        sodium chloride flush 0.9 % injection 5-40 mL  5-40 mL IntraVENous 2 times per day Kellie Lazar MD   10 mL at 10/24/22 2145    sodium chloride flush 0.9 % injection 5-40 mL  5-40 mL IntraVENous PRN Lillie Jackson MD        0.9 % sodium chloride infusion   IntraVENous PRN Kellie Lazar MD        ondansetron (ZOFRAN-ODT) disintegrating tablet 4 mg  4 mg Oral Q8H PRN Brittney Richards MD        Or    ondansetron [facility] injection 4 mg  4 mg IntraVENous Q6H PRN Brittney Richards MD        polyethylene glycol (GLYCOLAX) packet 17 g  17 g Oral Daily PRN Brittney Richards MD        acetaminophen (TYLENOL) tablet 650 mg  650 mg Oral Q6H PRN Brittney Richards MD        Or    acetaminophen (TYLENOL) suppository 650 mg  650 mg Rectal Q6H PRN Brittney Richards MD            Allergies:  Bactrim [sulfamethoxazole-trimethoprim]     Review of Systems:     Constitutional: there has been no unanticipated weight loss. +anxiety  Eyes: No visual changes or diplopia. No scleral icterus. ENT: No Headaches, hearing loss or vertigo. No mouth sores or sore throat. Cardiovascular: +chest pain  Respiratory: No cough or wheezing, no sputum production. No hematemesis. Gastrointestinal: No abdominal pain, appetite loss, blood in stools. No change in bowel or bladder habits. Genitourinary: No dysuria, trouble voiding, or hematuria. Musculoskeletal:  No gait disturbance, weakness or joint complaints. Integumentary: No rash or pruritis. Neurological: No headache, diplopia, change in muscle strength, numbness or tingling. No change in gait, balance, coordination, mood, affect, memory, mentation, behavior. Psychiatric: No anxiety, no depression. Endocrine: No malaise, fatigue or temperature intolerance. No excessive thirst, fluid intake, or urination. No tremor. Hematologic/Lymphatic: No abnormal bruising or bleeding, blood clots or swollen lymph nodes. Allergic/Immunologic: No nasal congestion or hives.       Physical Examination:    Vitals:    10/25/22 0430   BP: 110/70   Pulse: 60   Resp: 16   Temp: 97.6 °F (36.4 °C)   SpO2: 96%    Weight: 260 lb 14.4 oz (118.3 kg)         General Appearance:  Alert, cooperative, no distress, appears stated age   Head:  Normocephalic, without obvious abnormality, atraumatic   Eyes:  PERRL, conjunctiva/corneas clear       Nose: Nares normal, no drainage or sinus tenderness Throat: Lips, mucosa, and tongue normal   Neck: Supple, symmetrical, trachea midline, no adenopathy, thyroid: not enlarged, symmetric, no tenderness/mass/nodules, no carotid bruit or JVD       Lungs:   Clear to auscultation bilaterally, respirations unlabored   Chest Wall:  No tenderness or deformity   Heart:  Regular rate and rhythm, S1, S2 normal, no murmur, rub or gallop   Abdomen:   Soft, non-tender, bowel sounds active all four quadrants,  no masses, no organomegaly           Extremities: Extremities normal, atraumatic, no cyanosis or edema   Pulses: 2+ and symmetric   Skin: Skin color, texture, turgor normal, no rashes or lesions   Pysch: Normal mood and affect   Neurologic: Normal gross motor and sensory exam.         Labs  CBC:   Lab Results   Component Value Date/Time    WBC 6.3 10/25/2022 05:33 AM    RBC 3.69 10/25/2022 05:33 AM    HGB 11.8 10/25/2022 05:33 AM    HCT 35.5 10/25/2022 05:33 AM    MCV 96.0 10/25/2022 05:33 AM    RDW 13.3 10/25/2022 05:33 AM     10/25/2022 05:33 AM     CMP:    Lab Results   Component Value Date/Time     10/25/2022 05:33 AM    K 4.1 10/25/2022 05:33 AM    K 3.8 10/14/2022 06:01 PM     10/25/2022 05:33 AM    CO2 26 10/25/2022 05:33 AM    BUN 11 10/25/2022 05:33 AM    CREATININE 0.9 10/25/2022 05:33 AM    GFRAA >60 10/17/2022 04:25 AM    AGRATIO 1.4 10/23/2022 11:59 AM    LABGLOM >60 10/25/2022 05:33 AM    GLUCOSE 90 10/25/2022 05:33 AM    PROT 7.2 10/23/2022 11:59 AM    CALCIUM 9.2 10/25/2022 05:33 AM    BILITOT <0.2 10/23/2022 11:59 AM    ALKPHOS 69 10/23/2022 11:59 AM    AST 25 10/23/2022 11:59 AM    ALT 28 10/23/2022 11:59 AM     PT/INR:  No results found for: PTINR  Lab Results   Component Value Date    TROPONINI <0.01 10/25/2022       EKG:  I have reviewed EKG with the following interpretation:  Impression:  sinus rhythm, t wave inversion inferiorly     Echo:  Normal LV systolic function with an estimated ejection fraction of 60%.    D-shape septum due to right ventricular pressure and volume overload. Indeterminate diastolic function. Mild concentric LV hypertrophy. The right ventricle appears severely dilated and hypokinetic. There is severe hypokinesis of the RV free wall with the apex appearing   hyperdynamic (Medrano's sign) c/w pulmonary embolus. Mild tricuspid regurgitation with an estimated PASP of 23 mmHg. Mild pulmonic regurgitation present. Small anterior pericardial effusion noted. Stress: none  Cath: none  MRI/EP/Other: none    Old notes reviewed  Telemetry reviewd  Ekg personally reviewed  Chest xray personally reviewed  Echo, cath, and   Medications and labs reviewed  Moderate complexity/medical decision making due to extensive data review, extensive history review, independent review of data  moderate risk due to acute illness, evaluation of drug-drug interactions, medication management and diagnostic interventions    Discussed with nursing  Assessment  Patient Active Problem List   Diagnosis    Acute pulmonary embolism with acute cor pulmonale (HCC)    Right ventricular systolic dysfunction without heart failure    Chest pain    Pulmonary embolism (HCC)    Anxiety and depression    Atypical chest pain         Plan:    I had the opportunity to review the clinical symptoms and presentation of [employer]. Assessment/Plan:  Atypical chest pain  - constant, sharp, positional  - secondary to pulmonary embolism  - new problem  - troponin negative  Plan:  - continue anticoagulation  - pain control  - recommend 30 day monitor at discharge    2. Pulmonary embolism with right ventricular dysfunction  - new problem  - echocardiogram with improved RV function, no atrial pathology noted  Plan:  - evaluation for thrombophilia per hematology.  If she has lupus anticoagulant, warfarin would be a better choice  - patient needs 6 minute walk test before discharge to see if she needs oxygen  - ? CTEPH  - patient needs sleep study as outpatient - check emily    3. Concern for possible left atrial appendage thrombus  - appears to be coumadin ridge on review  - new problem  Plan:  - no indication for MISTI at this time. It does not appear to be a thrombus on review. Discussed with patient and mom. There is a significant risk of sedation in the setting of pulmonary embolism and right ventricular dysfunction. Furthermore, a thrombus in the atrial appendage will not  as anticoagulation is the treatment of choice    4. Anxiety   - new problem  - recent stressors and trauma  - alcohol consumption  Plan:  - patient requests mental health input  - consult psychiatry    5. Family history of reported cardiomyopathy  - new problem  - patient seen by childrens and not thought to have cardiomyopathy  Plan:  - recommend outpatient cardiac MRI    6.  Anemia    Saul Lucas, DO10/25/2022 7:42 AM

## 2022-10-26 ENCOUNTER — NURSE ONLY (OUTPATIENT)
Dept: CARDIOLOGY CLINIC | Age: 27
End: 2022-10-26

## 2022-10-26 LAB — ANTI-NUCLEAR ANTIBODY (ANA): NEGATIVE

## 2022-11-01 ENCOUNTER — OFFICE VISIT (OUTPATIENT)
Dept: INTERNAL MEDICINE CLINIC | Age: 27
End: 2022-11-01

## 2022-11-01 VITALS
OXYGEN SATURATION: 96 % | BODY MASS INDEX: 40.85 KG/M2 | DIASTOLIC BLOOD PRESSURE: 73 MMHG | SYSTOLIC BLOOD PRESSURE: 117 MMHG | TEMPERATURE: 96.4 F | WEIGHT: 260.3 LBS | HEART RATE: 90 BPM | HEIGHT: 67 IN

## 2022-11-01 DIAGNOSIS — I26.99 PULMONARY EMBOLISM, OTHER, UNSPECIFIED CHRONICITY, UNSPECIFIED WHETHER ACUTE COR PULMONALE PRESENT (HCC): Primary | ICD-10-CM

## 2022-11-01 PROCEDURE — 99213 OFFICE O/P EST LOW 20 MIN: CPT

## 2022-11-01 NOTE — PROGRESS NOTES
Outpatient Clinic Established Patient Note    Patient: Caren Fernandez  : 1995 (24 y.o.)  Date: 2022    CC: Hospital follow up     HPI:    Patient is a 24years old female who presented to the [facility] with complaints of swelling in her right leg on 10/14 along with palpitations and dyspnea on exertion. Patient was found to have bilateral PE with right heart strain on CT PE s/p bilateral thrombectomy the next day. Last echo on 10/15 with LVEF of 60% and dilated right ventricle with hypokinesis and hyperdynamic apex. Patient was started on heparin drip and transition to p.o. Eliquis. She is supposed to be on Eliquis 5 mg twice daily for next 3 months due to unprovoked DVT/PE. Hypercoagulable work-up was started but not completed at that time. Home Meds:  Prior to Visit Medications    Medication Sig Taking? Authorizing Provider   apixaban (ELIQUIS) 5 MG TABS tablet Take 1 tablet by mouth 2 times daily Yes Rey Hannon MD   hydrOXYzine HCl (ATARAX) 50 MG tablet Take 1 tablet by mouth every 6 hours as needed for Anxiety Yes Shaneka Solano MD   FLUoxetine (PROZAC) 20 MG capsule Take 1 capsule by mouth daily Yes Shaneka Solano MD       Allergies:    Bactrim [sulfamethoxazole-trimethoprim]    Health Maintenance Due   Topic Date Due    COVID-19 Vaccine (1) Never done    Varicella vaccine (1 of 2 - 2-dose childhood series) Never done    Pneumococcal 0-64 years Vaccine (1 - PCV) Never done    HPV vaccine (1 - 2-dose series) Never done    Depression Monitoring  Never done    HIV screen  Never done    Hepatitis C screen  Never done    DTaP/Tdap/Td vaccine (1 - Tdap) Never done    Pap smear  Never done    Flu vaccine (1) Never done         There is no immunization history on file for this patient. Review of Systems  A 10-organ Review Of Systems was obtained and otherwise unremarkable except as per HPI. Data: Old records have been reviewed electronically.     PHYSICAL EXAM:  /73 (Site: Right Upper Arm, Position: Sitting, Cuff Size: Large Adult)   Pulse 90   Temp (!) 96.4 °F (35.8 °C) (Temporal)   Ht 5' 7\" (1.702 m)   Wt 260 lb 4.8 oz (118.1 kg)   SpO2 96%   BMI 40.77 kg/m²   Physical Exam  Constitutional:       Appearance: She is obese. HENT:      Head: Normocephalic and atraumatic. Right Ear: External ear normal.      Left Ear: External ear normal.      Nose: Nose normal.      Mouth/Throat:      Mouth: Mucous membranes are moist.   Eyes:      Pupils: Pupils are equal, round, and reactive to light. Cardiovascular:      Rate and Rhythm: Normal rate and regular rhythm. Pulses: Normal pulses. Heart sounds: Normal heart sounds. Pulmonary:      Effort: Pulmonary effort is normal.      Breath sounds: Normal breath sounds. Abdominal:      Palpations: Abdomen is soft. Musculoskeletal:         General: Normal range of motion. Skin:     General: Skin is warm. Neurological:      General: No focal deficit present. Mental Status: She is alert and oriented to person, place, and time. Psychiatric:         Mood and Affect: Mood normal.         Thought Content: Thought content normal.       Assessment & Plan:      1. Pulmonary embolism  Continue taking current medications for next 3 months   - apixaban (ELIQUIS) 5 MG TABS tablet; Take 1 tablet by mouth 2 times daily  Dispense: 120 tablet; Refill: 0  -Follow up with cardiology in 3 months, currently following up with Dr Ernestene Gottron for ECHO in 3-4 months       Return in about 4 weeks (around 11/29/2022).     Dispo: Pt has been staffed with Dr. Mena Crane  _______________  Fox Pitt MD, 11/1/2022 2:39 PM   PGY-2

## 2022-11-07 PROCEDURE — 93244 EXT ECG>48HR<7D REV&INTERPJ: CPT | Performed by: INTERNAL MEDICINE

## 2022-11-09 ENCOUNTER — TELEPHONE (OUTPATIENT)
Dept: CARDIOLOGY CLINIC | Age: 27
End: 2022-11-09

## 2022-11-09 ENCOUNTER — TELEPHONE (OUTPATIENT)
Dept: INTERNAL MEDICINE CLINIC | Age: 27
End: 2022-11-09

## 2022-11-09 NOTE — TELEPHONE ENCOUNTER
Pt grandmother called in stating that pt has started taking  apixaban (ELIQUIS) 5 MG TABS tablet. Pt woke up this morning and as experiencing swelling, most of swelling is in the pt face and hands. After looking up the mediation they found that swelling is one of the side effects from the medication. SO she wants to know if this is serious and what she needs to do.     Please call Radha(grandmother) per pt request. [phone]

## 2022-11-09 NOTE — TELEPHONE ENCOUNTER
I spoke to Aikn. She states that Waylon Bejarano has had the swelling x1 week. It is not worsening, her face is not bad. Her legs are tight and hands puffy. She has no throat tightness or difficulty breathing. Symptoms are not worsening. She f/w Dr. Linda Hawley a resident at the [facility]; she is the prescriber of the Eliquis. Waylon Bejarano has an jonathna't with Dr. Linda Hawley 11/29/22. There is a financial concern as she does not have insurance right now but will have it starting Jan 1, 2023. She has been taking the Eliquis as prescribed as her grandmother paid for it. Also she has not been taking her Prozac due to the financial issues but is on trazodone. I advised Milena to call Dr. Linda Hawley since she is the prescriber (had an OV 11/1/22). She agreed to do that now and will call us back if any problems. She is concerned if she will get any advice because of the financial issues; she was surprised Waylon Bejarano has an upcoming jonathan't with Dr. Linda Hawley.

## 2022-11-10 ENCOUNTER — CLINICAL DOCUMENTATION (OUTPATIENT)
Dept: CARDIOLOGY CLINIC | Age: 27
End: 2022-11-10

## 2022-11-25 ENCOUNTER — TELEPHONE (OUTPATIENT)
Dept: CARDIOLOGY CLINIC | Age: 27
End: 2022-11-25

## 2022-11-25 NOTE — TELEPHONE ENCOUNTER
Called pt and lmom to call back to go over results. Please let patient know the monitor looked ok, no changes in plan of care   ----- Message -----   From: Janet Low MA   Sent: 11/7/2022   1:19 PM EST   To: Rafaela Arrington DO   Subject: Edit                                               The scan below was edited by CRISTOFER [facility] on 11/7/2022 at 1:19 PM; it is attached to the following: Holter monitor 24 hour on 11/7/2022.

## 2022-12-29 DIAGNOSIS — I26.99 PULMONARY EMBOLISM, OTHER, UNSPECIFIED CHRONICITY, UNSPECIFIED WHETHER ACUTE COR PULMONALE PRESENT (HCC): ICD-10-CM

## 2022-12-29 NOTE — TELEPHONE ENCOUNTER
Medication Refill    Medication needing refilled:  apixaban (ELIQUIS    Dosage of the medication:  5 MG TABS tablet     How are you taking this medication (QD, BID, TID, QID, PRN):  Take 1 tablet by mouth 2 times daily  30 or 90 day supply called in:  90  When will you run out of your medication:  out  Which Pharmacy are we sending the medication to?:      [facility] [id number] [place] [address]   [address] [place] [address]   Phone:  [phone]  Fax:  [phone]    Pt states she doesn't have any insurance and [facility] was following her as her primary but she was wondering if we could refill for her or if she still needs to go to [facility]

## 2022-12-29 NOTE — TELEPHONE ENCOUNTER
Last person to refill: 11.01.22  Last office visit: 10.19.22 NPKV  Last Labs: 10.25.22   Next office visit:   01.09.23 JEREMY

## 2023-01-09 ENCOUNTER — HOSPITAL ENCOUNTER (OUTPATIENT)
Age: 28
Discharge: HOME | End: 2023-01-09
Payer: COMMERCIAL

## 2023-01-09 ENCOUNTER — OFFICE VISIT (OUTPATIENT)
Dept: CARDIOLOGY CLINIC | Age: 28
End: 2023-01-09

## 2023-01-09 VITALS
HEART RATE: 65 BPM | WEIGHT: 274.2 LBS | HEIGHT: 67 IN | OXYGEN SATURATION: 98 % | SYSTOLIC BLOOD PRESSURE: 102 MMHG | DIASTOLIC BLOOD PRESSURE: 64 MMHG | BODY MASS INDEX: 43.03 KG/M2

## 2023-01-09 DIAGNOSIS — I27.24 CTEPH (CHRONIC THROMBOEMBOLIC PULMONARY HYPERTENSION) (HCC): ICD-10-CM

## 2023-01-09 DIAGNOSIS — I26.99 PULMONARY EMBOLISM, OTHER, UNSPECIFIED CHRONICITY, UNSPECIFIED WHETHER ACUTE COR PULMONALE PRESENT (HCC): ICD-10-CM

## 2023-01-09 DIAGNOSIS — I26.99 PULMONARY EMBOLISM, OTHER, UNSPECIFIED CHRONICITY, UNSPECIFIED WHETHER ACUTE COR PULMONALE PRESENT (HCC): Primary | ICD-10-CM

## 2023-01-09 DIAGNOSIS — R06.02 SOB (SHORTNESS OF BREATH): ICD-10-CM

## 2023-01-09 DIAGNOSIS — R07.9 CHEST PAIN, UNSPECIFIED TYPE: ICD-10-CM

## 2023-01-09 LAB
A/G RATIO: 1.3 (ref 1.1–2.2)
ALBUMIN SERPL-MCNC: 4 G/DL (ref 3.4–5)
ALP BLD-CCNC: 52 U/L (ref 40–129)
ALT SERPL-CCNC: 23 U/L (ref 10–40)
ANION GAP SERPL CALCULATED.3IONS-SCNC: 15 MMOL/L (ref 3–16)
AST SERPL-CCNC: 21 U/L (ref 15–37)
BILIRUB SERPL-MCNC: 0.4 MG/DL (ref 0–1)
BUN BLDV-MCNC: 13 MG/DL (ref 7–20)
CALCIUM SERPL-MCNC: 8.9 MG/DL (ref 8.3–10.6)
CHLORIDE BLD-SCNC: 105 MMOL/L (ref 99–110)
CO2: 21 MMOL/L (ref 21–32)
CREAT SERPL-MCNC: 0.9 MG/DL (ref 0.6–1.1)
GFR SERPL CREATININE-BSD FRML MDRD: >60 ML/MIN/{1.73_M2}
GLUCOSE BLD-MCNC: 89 MG/DL (ref 70–99)
HCT VFR BLD CALC: 37.4 % (ref 36–48)
HEMOGLOBIN: 11.9 G/DL (ref 12–16)
MCH RBC QN AUTO: 30.3 PG (ref 26–34)
MCHC RBC AUTO-ENTMCNC: 31.9 G/DL (ref 31–36)
MCV RBC AUTO: 95 FL (ref 80–100)
PDW BLD-RTO: 13.5 % (ref 12.4–15.4)
PLATELET # BLD: 269 K/UL (ref 135–450)
PMV BLD AUTO: 8.4 FL (ref 5–10.5)
POTASSIUM SERPL-SCNC: 3.8 MMOL/L (ref 3.5–5.1)
PRO-BNP: 147 PG/ML (ref 0–124)
RBC # BLD: 3.93 M/UL (ref 4–5.2)
SODIUM BLD-SCNC: 141 MMOL/L (ref 136–145)
TOTAL PROTEIN: 7.2 G/DL (ref 6.4–8.2)
WBC # BLD: 5.9 K/UL (ref 4–11)

## 2023-01-09 PROCEDURE — 85027 COMPLETE CBC AUTOMATED: CPT

## 2023-01-09 PROCEDURE — 83880 ASSAY OF NATRIURETIC PEPTIDE: CPT

## 2023-01-09 PROCEDURE — 80053 COMPREHEN METABOLIC PANEL: CPT

## 2023-01-09 PROCEDURE — 36415 COLL VENOUS BLD VENIPUNCTURE: CPT

## 2023-01-09 NOTE — PROGRESS NOTES
[facility]  Advanced CHF/Pulmonary Hypertension   Cardiac Evaluation      Yasmine Coleman  YOB: 1995    Date of Visit:  1/9/23      Chief Complaint   Patient presents with    Other     Right ventricular dysfunction without heart failure        History of Present Illness:  Yasmine Coleman is a 24 y.o. female who presents from referral from Dr. Whitney Ty for consultation and management of     Yasmine Coleman is 24 y.o. and had a past medical history of anxiety and depression. In the ED on 10/14/22, she was tachycardic, desating on minimal exertion, placed on 2 liters oxygen. D dimer was very high, CTPA showed bilateral PE with right heart strain. Troponin was also elevated. She had thrombolytic treatment by vascular surgery. She states she has gained 20 pounds since last office visit. Denies chest pain, shortness of breath, syncope, orthopnea, palpitations, or dizziness. She feels her breathing is not normal.  She works from home and sits a lot at the computer. Discussed getting labs today and an Echo. Allergies   Allergen Reactions    Bactrim [Sulfamethoxazole-Trimethoprim] Hives     Current Outpatient Medications   Medication Sig Dispense Refill    apixaban (ELIQUIS) 5 MG TABS tablet Take 1 tablet by mouth 2 times daily 120 tablet 0    FLUoxetine (PROZAC) 20 MG capsule Take 1 capsule by mouth daily (Patient not taking: Reported on 1/9/2023) 30 capsule 3     No current facility-administered medications for this visit. Past Medical History:   Diagnosis Date    Cardiomyopathy [facility])     \"had that since I was born\"    History of pulmonary embolus (PE)      No past surgical history on file. No family history on file.   Social History     Socioeconomic History    Marital status: Single     Spouse name: Not on file    Number of children: Not on file    Years of education: Not on file    Highest education level: Not on file   Occupational History    Not on file   Tobacco Use Smoking status: Never    Smokeless tobacco: Never   Vaping Use    Vaping Use: Never used   Substance and Sexual Activity    Alcohol use: Yes     Comment: social    Drug use: Not Currently     Types: Marijuana [facility])    Sexual activity: Not on file   Other Topics Concern    Not on file   Social History Narrative    Not on file     Social Determinants of Health     Financial Resource Strain: Not on file   Food Insecurity: Not on file   Transportation Needs: Not on file   Physical Activity: Not on file   Stress: Not on file   Social Connections: Not on file   Intimate Partner Violence: Not on file   Housing Stability: Not on file       Review of Systems:   Constitutional: there has been no unanticipated weight loss. There's been no change in energy level, sleep pattern, or activity level. Eyes: No visual changes or diplopia. No scleral icterus. ENT: No Headaches, hearing loss or vertigo. No mouth sores or sore throat. Cardiovascular: Reviewed in HPI  Respiratory: No cough or wheezing, no sputum production. No hematemesis. Gastrointestinal: No abdominal pain, appetite loss, blood in stools. No change in bowel or bladder habits. Genitourinary: No dysuria, trouble voiding, or hematuria. Musculoskeletal:  No gait disturbance, weakness or joint complaints. Integumentary: No rash or pruritis. Neurological: No headache, diplopia, change in muscle strength, numbness or tingling. No change in gait, balance, coordination, mood, affect, memory, mentation, behavior. Psychiatric: No anxiety, no depression. Endocrine: No malaise, fatigue or temperature intolerance. No excessive thirst, fluid intake, or urination. No tremor. Hematologic/Lymphatic: No abnormal bruising or bleeding, blood clots or swollen lymph nodes. Allergic/Immunologic: No nasal congestion or hives.     Physical Examination:    Vitals:    01/09/23 1004   BP: 102/64   Pulse: 65   SpO2: 98%   Weight: 274 lb 3.2 oz (124.4 kg)   Height: 5' 7\" (1.702 m)     Body mass index is 42.95 kg/m². Wt Readings from Last 3 Encounters:   01/09/23 274 lb 3.2 oz (124.4 kg)   11/01/22 260 lb 4.8 oz (118.1 kg)   10/25/22 261 lb 1.6 oz (118.4 kg)     BP Readings from Last 3 Encounters:   01/09/23 102/64   11/01/22 117/73   10/25/22 (!) 151/83     Constitutional and General Appearance:  Overweight   WD/WN in NAD  HEENT:  NC/AT  MOMO  No problems with hearing  Skin:  Warm, dry  Respiratory:  Normal excursion and expansion without use of accessory muscles  Resp Auscultation: Normal breath sounds without dullness  Cardiovascular: The apical impulses not displaced  Heart tones are crisp and normal  Cervical veins are not engorged  The carotid upstroke is normal in amplitude and contour without delay or bruit  JVP less than 8 cm H2O  RRR with nl S1 and S2 without m,r,g  Peripheral pulses are symmetrical and full  There is no clubbing, cyanosis of the extremities. No edema  Femoral Arteries: 2+ and equal  Pedal Pulses: 2+ and equal   Neck:  No thyromegaly  Abdomen: obese. No masses or tenderness  Liver/Spleen: No Abnormalities Noted  Neurological/Psychiatric:  Alert and oriented in all spheres  Moves all extremities well  Exhibits normal gait balance and coordination  No abnormalities of mood, affect, memory, mentation, or behavior are noted    Labs were reviewed including labs from other hospital systems through [facility]. Cardiac testing was reviewed including echos, nuclear scans, cardiac catheterization, including from other hospital systems through [facility]. Assessment:    1. Pulmonary embolism, other, unspecified chronicity, unspecified whether acute cor pulmonale present (HCC)    2. Chest pain, unspecified type    3. CTEPH (chronic thromboembolic pulmonary hypertension) ([facility] [address]    4. SOB (shortness of breath)         Plan:   Labs today. CBC (anemic), CMP (liver/kidney), BNP (water fluid level).    2.    Echo at [facility] Monday 1/16/23 to follow up on pulmonary pressures after PE  3. See Dr. Perkins Favorite in 4 months    Scribe's attestation: This note was scribed in the presence of Josh Martinez M.D. by Sara Chacon RN     The scribe's documentation has been prepared under my direction and personally reviewed by me in its entirety. I confirm that the note above accurately reflects all work, treatment, procedures, and medical decision making performed by me. QUALITY MEASURES  1. Tobacco Cessation Counseling: NA  Quit in October 2022  2. Retake of BP if >140/90:   NA  3. Documentation to PCP/referring for new patient:  Sent to PCP at close of office visit  4. CAD patient on anti-platelet: NA  5. CAD patient on STATIN therapy:  NA  6. Patient with CHF and aFib on anticoagulation:  Yes for PE       Time Based Itemization  A total of 30 minutes was spent on today's patient encounter. If applicable, non-patient-facing activities:  ( x)Preparing to see the patient and reviewing records  (x ) Individual interpretation of results  ( ) Discussion or coordination of care with other health care professionals  ( x) Ordering of unique tests, medications, or procedures  ( x) Documentation within the EHR        I appreciate the opportunity of cooperating in the care of this patient.     Josh Martinez M.D., [facility]

## 2023-01-09 NOTE — PATIENT INSTRUCTIONS
Plan:   Labs today. CBC, CMP, BNP.   2.    Echo at [facility] Monday 1/16/23 at 3pm   3.     See Dr. Rekha De La Torre in 4 months

## 2023-03-13 ENCOUNTER — TELEPHONE (OUTPATIENT)
Dept: PULMONOLOGY | Age: 28
End: 2023-03-13

## 2023-10-30 ENCOUNTER — TELEPHONE (OUTPATIENT)
Dept: CARDIOLOGY CLINIC | Age: 28
End: 2023-10-30

## 2023-10-30 NOTE — TELEPHONE ENCOUNTER
Pt has been rescheduled for Echo on 11/20 4:00 at [facility]. Echo order is from 1/09/23. When linking aptt to order it advised the appt was after the expiration of 04/06/23. Does there need to be an updated order? Please advise.

## 2024-02-15 ENCOUNTER — OFFICE VISIT (OUTPATIENT)
Age: 29
End: 2024-02-15

## 2024-02-15 VITALS
RESPIRATION RATE: 17 BRPM | DIASTOLIC BLOOD PRESSURE: 85 MMHG | WEIGHT: 274 LBS | TEMPERATURE: 98.5 F | OXYGEN SATURATION: 98 % | HEART RATE: 69 BPM | HEIGHT: 67 IN | BODY MASS INDEX: 43 KG/M2 | SYSTOLIC BLOOD PRESSURE: 128 MMHG

## 2024-02-15 DIAGNOSIS — L02.31 ABSCESS OF BUTTOCK, RIGHT: Primary | ICD-10-CM

## 2024-02-15 RX ORDER — CEPHALEXIN 500 MG/1
500 CAPSULE ORAL 3 TIMES DAILY
Qty: 15 CAPSULE | Refills: 0 | Status: SHIPPED | OUTPATIENT
Start: 2024-02-15 | End: 2024-02-20

## 2024-02-15 NOTE — PROGRESS NOTES
Jeannie Tillman (:  1995) is a 28 y.o. female,New patient, here for evaluation of the following chief complaint(s):  Other (Wiping herself a few weeks ago and felt a hair rip out and she feel like she has an ingrown hair and had this done this before , she is on her period )      ASSESSMENT/PLAN:    ICD-10-CM    1. Abscess of buttock, right  L02.31 cephALEXin (KEFLEX) 500 MG capsule        Patient is experiencing a small abscess due to a hair follicle to her right inner buttock. She has had this before and was placed on Keflex and Bactrim at that time to clear it. She defers exam due to currently being on her menses and feeling uncomfortable. Encouraged her to continue taking warm showers, using warm moist compresses, and ensuring that the area stays as dry as possible. If the abscess persists for the next day or two with those measures, she can begin taking prescribed Keflex. Return for worsening or persistent symptoms. She is understanding and agreeable to this plan.     Dx Disposition: folliculitis, ingrown hair, cellulitis  Education and handout provided on diagnosis and management of symptoms.   AVS reviewed with patient. Follow up as needed in UC or with PCP for new or worsening symptoms.   Return if symptoms worsen or fail to improve.    SUBJECTIVE/OBJECTIVE:  Patient presents to the clinic with complaints of a bump inside of her right buttocks. This started yesterday after wiping very hard and feeling a hair pull out. This has happened before and she was placed on Bactrim that cleared it up. Denies any fever or body aches. She has tried a warm shower with some relief.        History provided by:  Patient   used: No        Vitals:    02/15/24 1522   BP: 128/85   Pulse: 69   Resp: 17   Temp: 98.5 °F (36.9 °C)   SpO2: 98%   Weight: 124.3 kg (274 lb)   Height: 1.702 m (5' 7\")       Review of Systems   Constitutional:  Negative for chills, fatigue and fever.   Skin:  Positive for wound